# Patient Record
Sex: FEMALE | Race: WHITE | NOT HISPANIC OR LATINO | Employment: OTHER | URBAN - METROPOLITAN AREA
[De-identification: names, ages, dates, MRNs, and addresses within clinical notes are randomized per-mention and may not be internally consistent; named-entity substitution may affect disease eponyms.]

---

## 2023-08-31 ENCOUNTER — HOSPITAL ENCOUNTER (OUTPATIENT)
Dept: DATA CONVERSION | Facility: HOSPITAL | Age: 73
Discharge: HOME | End: 2023-08-31
Payer: MEDICARE

## 2023-08-31 DIAGNOSIS — E11.9 TYPE 2 DIABETES MELLITUS WITHOUT COMPLICATIONS (MULTI): ICD-10-CM

## 2023-08-31 LAB
ALBUMIN SERPL-MCNC: 4.3 GM/DL (ref 3.5–5)
ALBUMIN/GLOB SERPL: 1.7 RATIO (ref 1.5–3)
ALP BLD-CCNC: 87 U/L (ref 35–125)
ALT SERPL-CCNC: 12 U/L (ref 5–40)
ANION GAP SERPL CALCULATED.3IONS-SCNC: 15 MMOL/L (ref 0–19)
AST SERPL-CCNC: 18 U/L (ref 5–40)
BILIRUB SERPL-MCNC: 0.4 MG/DL (ref 0.1–1.2)
BUN SERPL-MCNC: 9 MG/DL (ref 8–25)
BUN/CREAT SERPL: 9 RATIO (ref 8–21)
CALCIUM SERPL-MCNC: 9.8 MG/DL (ref 8.5–10.4)
CHLORIDE SERPL-SCNC: 104 MMOL/L (ref 97–107)
CO2 SERPL-SCNC: 24 MMOL/L (ref 24–31)
CREAT SERPL-MCNC: 1 MG/DL (ref 0.4–1.6)
CREAT UR-MCNC: 373.1 MG/DL
GFR SERPL CREATININE-BSD FRML MDRD: 60 ML/MIN/1.73 M2
GLOBULIN SER-MCNC: 2.6 G/DL (ref 1.9–3.7)
GLUCOSE SERPL-MCNC: 114 MG/DL (ref 65–99)
HBA1C MFR BLD: 6.9 % (ref 4–6)
MICROALBUMIN UR-MCNC: 87 MG/L (ref 0–23)
MICROALBUMIN/CREAT UR: 23.3 MG/G (ref 0–30)
POTASSIUM SERPL-SCNC: 4.1 MMOL/L (ref 3.4–5.1)
PROT SERPL-MCNC: 6.9 G/DL (ref 5.9–7.9)
SODIUM SERPL-SCNC: 143 MMOL/L (ref 133–145)
THYROTROPIN (MIU/L) IN SER/PLAS BY DETECTION LIMIT <= 0.05 MIU/L: 1.98 MIU/L (ref 0.44–3.98)
VIT B12 SERPL-MCNC: 383 PG/ML (ref 211–946)

## 2023-09-07 PROBLEM — M85.80 OSTEOPENIA: Status: ACTIVE | Noted: 2023-09-07

## 2023-09-07 PROBLEM — C43.62 MALIGNANT MELANOMA OF LEFT UPPER EXTREMITY INCLUDING SHOULDER (MULTI): Status: ACTIVE | Noted: 2023-09-07

## 2023-09-07 PROBLEM — E78.5 HYPERLIPIDEMIA: Status: ACTIVE | Noted: 2023-09-07

## 2023-09-07 PROBLEM — I48.19 OTHER PERSISTENT ATRIAL FIBRILLATION (MULTI): Status: ACTIVE | Noted: 2023-09-07

## 2023-09-07 PROBLEM — I48.0 PAROXYSMAL ATRIAL FIBRILLATION (MULTI): Status: ACTIVE | Noted: 2023-09-07

## 2023-09-07 PROBLEM — C09.9: Status: ACTIVE | Noted: 2023-09-07

## 2023-09-07 PROBLEM — I25.5 ISCHEMIC CARDIOMYOPATHY: Status: ACTIVE | Noted: 2023-09-07

## 2023-09-07 PROBLEM — E55.9 VITAMIN D DEFICIENCY: Status: ACTIVE | Noted: 2023-09-07

## 2023-09-07 PROBLEM — M51.26 LUMBAGO DUE TO DISPLACEMENT OF INTERVERTEBRAL DISC: Status: ACTIVE | Noted: 2023-09-07

## 2023-09-07 PROBLEM — K21.9 CHRONIC GERD: Status: ACTIVE | Noted: 2023-09-07

## 2023-09-07 PROBLEM — I10 HTN (HYPERTENSION): Status: ACTIVE | Noted: 2023-09-07

## 2023-09-07 PROBLEM — C44.42 SCC (SQUAMOUS CELL CARCINOMA), SCALP/NECK: Status: ACTIVE | Noted: 2023-09-07

## 2023-09-07 PROBLEM — R59.1 LYMPHADENOPATHY: Status: ACTIVE | Noted: 2023-09-07

## 2023-09-07 PROBLEM — Z95.5 HISTORY OF CORONARY ARTERY STENT PLACEMENT: Status: ACTIVE | Noted: 2023-09-07

## 2023-09-07 PROBLEM — G43.909 MIGRAINES: Status: ACTIVE | Noted: 2023-09-07

## 2023-09-07 PROBLEM — I25.10 CORONARY ARTERY DISEASE INVOLVING NATIVE CORONARY ARTERY OF NATIVE HEART WITHOUT ANGINA PECTORIS: Status: ACTIVE | Noted: 2023-09-07

## 2023-09-07 PROBLEM — E11.9 TYPE 2 DIABETES MELLITUS WITHOUT COMPLICATION, WITHOUT LONG-TERM CURRENT USE OF INSULIN (MULTI): Status: ACTIVE | Noted: 2023-09-07

## 2023-09-07 PROBLEM — M51.16 LUMBAR DISC DISEASE WITH RADICULOPATHY: Status: ACTIVE | Noted: 2023-09-07

## 2023-09-07 PROBLEM — C09.9 SQUAMOUS CELL CARCINOMA OF TONSIL (MULTI): Status: ACTIVE | Noted: 2023-09-07

## 2023-09-07 PROBLEM — N20.0 RENAL STONES: Status: ACTIVE | Noted: 2023-09-07

## 2023-09-07 PROBLEM — Z85.820 H/O MALIGNANT MELANOMA: Status: ACTIVE | Noted: 2023-09-07

## 2023-09-07 PROBLEM — G89.29 OTHER CHRONIC PAIN: Status: ACTIVE | Noted: 2023-09-07

## 2023-09-07 PROBLEM — R22.1 NECK MASS: Status: ACTIVE | Noted: 2023-09-07

## 2023-09-07 PROBLEM — Z98.890: Status: ACTIVE | Noted: 2023-09-07

## 2023-11-17 ENCOUNTER — APPOINTMENT (OUTPATIENT)
Dept: OTOLARYNGOLOGY | Facility: HOSPITAL | Age: 73
End: 2023-11-17
Payer: COMMERCIAL

## 2023-12-08 ENCOUNTER — APPOINTMENT (OUTPATIENT)
Dept: OTOLARYNGOLOGY | Facility: HOSPITAL | Age: 73
End: 2023-12-08
Payer: COMMERCIAL

## 2023-12-09 DIAGNOSIS — G43.809 OTHER MIGRAINE WITHOUT STATUS MIGRAINOSUS, NOT INTRACTABLE: Primary | ICD-10-CM

## 2023-12-11 DIAGNOSIS — K21.9 GERD WITHOUT ESOPHAGITIS: Primary | ICD-10-CM

## 2023-12-11 RX ORDER — TOPIRAMATE 100 MG/1
TABLET, FILM COATED ORAL
Qty: 270 TABLET | Refills: 3 | Status: SHIPPED | OUTPATIENT
Start: 2023-12-11 | End: 2024-02-01 | Stop reason: ALTCHOICE

## 2023-12-11 RX ORDER — PANTOPRAZOLE SODIUM 40 MG/1
40 TABLET, DELAYED RELEASE ORAL DAILY
Qty: 90 TABLET | Refills: 3 | Status: SHIPPED | OUTPATIENT
Start: 2023-12-11 | End: 2024-02-01 | Stop reason: WASHOUT

## 2023-12-11 RX ORDER — PANTOPRAZOLE SODIUM 40 MG/1
1 TABLET, DELAYED RELEASE ORAL DAILY
COMMUNITY
Start: 2021-03-31 | End: 2023-12-11 | Stop reason: SDUPTHER

## 2023-12-18 ENCOUNTER — TELEPHONE (OUTPATIENT)
Dept: PRIMARY CARE | Facility: CLINIC | Age: 73
End: 2023-12-18
Payer: MEDICARE

## 2023-12-18 DIAGNOSIS — E78.2 MIXED HYPERLIPIDEMIA: Primary | ICD-10-CM

## 2023-12-18 RX ORDER — ROSUVASTATIN CALCIUM 10 MG/1
10 TABLET, COATED ORAL DAILY
Qty: 30 TABLET | Refills: 5 | Status: SHIPPED | OUTPATIENT
Start: 2023-12-18 | End: 2023-12-29 | Stop reason: SDUPTHER

## 2023-12-18 NOTE — TELEPHONE ENCOUNTER
Dr. Carter coverage.  Patient is requesting a refill of rosuvastatin but an allergy warning is coming up.  Unsure of how to proceed.  Last seen 08/01/23.

## 2023-12-28 ENCOUNTER — TELEPHONE (OUTPATIENT)
Dept: PRIMARY CARE | Facility: CLINIC | Age: 73
End: 2023-12-28
Payer: MEDICARE

## 2023-12-28 DIAGNOSIS — E78.2 MIXED HYPERLIPIDEMIA: ICD-10-CM

## 2023-12-28 NOTE — TELEPHONE ENCOUNTER
Patient called and stated she only received a 30 day supply of her rosuvastatin.  She is asking for the additional 60 days to be called into [facility] pharmacy.

## 2023-12-29 RX ORDER — ROSUVASTATIN CALCIUM 10 MG/1
10 TABLET, COATED ORAL DAILY
Qty: 90 TABLET | Refills: 3 | Status: SHIPPED | OUTPATIENT
Start: 2023-12-29 | End: 2024-02-01 | Stop reason: WASHOUT

## 2024-01-05 ENCOUNTER — OFFICE VISIT (OUTPATIENT)
Dept: PRIMARY CARE | Facility: CLINIC | Age: 74
End: 2024-01-05
Payer: MEDICARE

## 2024-01-05 ENCOUNTER — TELEPHONE (OUTPATIENT)
Dept: OTOLARYNGOLOGY | Facility: HOSPITAL | Age: 74
End: 2024-01-05
Payer: MEDICARE

## 2024-01-05 VITALS
OXYGEN SATURATION: 97 % | DIASTOLIC BLOOD PRESSURE: 70 MMHG | HEART RATE: 78 BPM | SYSTOLIC BLOOD PRESSURE: 138 MMHG | WEIGHT: 147 LBS | HEIGHT: 64 IN | BODY MASS INDEX: 25.1 KG/M2

## 2024-01-05 DIAGNOSIS — H34.8120 CENTRAL RETINAL VEIN OCCLUSION WITH MACULAR EDEMA OF LEFT EYE (CMS-HCC): Primary | ICD-10-CM

## 2024-01-05 DIAGNOSIS — R21 RASH: ICD-10-CM

## 2024-01-05 PROCEDURE — 1126F AMNT PAIN NOTED NONE PRSNT: CPT | Performed by: FAMILY MEDICINE

## 2024-01-05 PROCEDURE — 1159F MED LIST DOCD IN RCRD: CPT | Performed by: FAMILY MEDICINE

## 2024-01-05 PROCEDURE — 99213 OFFICE O/P EST LOW 20 MIN: CPT | Performed by: FAMILY MEDICINE

## 2024-01-05 PROCEDURE — 1036F TOBACCO NON-USER: CPT | Performed by: FAMILY MEDICINE

## 2024-01-05 RX ORDER — LOSARTAN POTASSIUM 25 MG/1
25 TABLET ORAL DAILY
COMMUNITY
Start: 2023-10-24 | End: 2024-02-01 | Stop reason: SDUPTHER

## 2024-01-05 RX ORDER — METFORMIN HYDROCHLORIDE EXTENDED-RELEASE TABLETS 500 MG/1
500 TABLET, FILM COATED, EXTENDED RELEASE ORAL
COMMUNITY
End: 2024-02-01 | Stop reason: WASHOUT

## 2024-01-05 RX ORDER — ACETAMINOPHEN AND CODEINE PHOSPHATE 300; 60 MG/1; MG/1
1 TABLET ORAL EVERY 6 HOURS PRN
COMMUNITY
Start: 2023-02-02 | End: 2024-02-01 | Stop reason: SDUPTHER

## 2024-01-05 RX ORDER — BLOOD SUGAR DIAGNOSTIC
STRIP MISCELLANEOUS
COMMUNITY
Start: 2023-07-17

## 2024-01-05 RX ORDER — ROSUVASTATIN CALCIUM 10 MG/1
10 TABLET, COATED ORAL DAILY
COMMUNITY
Start: 2023-12-18 | End: 2024-02-01 | Stop reason: ALTCHOICE

## 2024-01-05 RX ORDER — BETAMETHASONE VALERATE 1.2 MG/G
OINTMENT TOPICAL AS NEEDED
COMMUNITY
Start: 2023-08-28

## 2024-01-05 RX ORDER — PANTOPRAZOLE SODIUM 40 MG/1
40 TABLET, DELAYED RELEASE ORAL
COMMUNITY
Start: 2023-12-15 | End: 2024-02-01 | Stop reason: ALTCHOICE

## 2024-01-05 RX ORDER — ALLOPURINOL 300 MG/1
300 TABLET ORAL DAILY
COMMUNITY
Start: 2023-10-24

## 2024-01-05 RX ORDER — TOPIRAMATE 100 MG/1
100 TABLET, FILM COATED ORAL DAILY
COMMUNITY
Start: 2023-12-17 | End: 2024-02-01 | Stop reason: WASHOUT

## 2024-01-05 RX ORDER — LIDOCAINE 50 MG/G
1 PATCH TOPICAL AS NEEDED
COMMUNITY
Start: 2023-04-24

## 2024-01-05 ASSESSMENT — PATIENT HEALTH QUESTIONNAIRE - PHQ9
SUM OF ALL RESPONSES TO PHQ9 QUESTIONS 1 AND 2: 0
2. FEELING DOWN, DEPRESSED OR HOPELESS: NOT AT ALL
1. LITTLE INTEREST OR PLEASURE IN DOING THINGS: NOT AT ALL

## 2024-01-05 ASSESSMENT — ENCOUNTER SYMPTOMS
EYE ITCHING: 1
NUMBNESS: 0
EYE REDNESS: 0
EYE PAIN: 0

## 2024-01-05 ASSESSMENT — PAIN SCALES - GENERAL: PAINLEVEL: 0-NO PAIN

## 2024-01-05 NOTE — TELEPHONE ENCOUNTER
Patient did not show for her appt at 12:45pm today. Called patient and left message to make sure she is ok and to reschedule another appt. Waiting for return call.

## 2024-01-05 NOTE — PROGRESS NOTES
[facility]: [facility]  E/M EVALUATION    Damari Hicks is a 73 y.o. female who presents for Follow-up (Pt lost vision in left eye, concerned with possible shingles per her ophthalmologist (has pending appt with specialist)/dmw/Pt also concerned with blister below her lip/dmw).    Subjective   Week ago got cap and crack  her left eye.  Flushed it out with water everyday.  Having blurry vision left eye.  She went to the eye doctor.  Was told there blood pressure was 180 there.  Found to have central retinal vein occlusion.  Has appt with retinal specialist on the 12th.      She did have blister form right lower lip  using cortisone cream then got one on the left  lower lip.  And latera one around the right eye.         Review of Systems   Eyes:  Positive for itching and visual disturbance. Negative for pain and redness.   Skin:  Positive for rash.   Neurological:  Negative for numbness.       Objective   Vitals:    01/05/24 1113   BP: 138/70   Pulse: 78   SpO2: 97%     Physical Exam  Constitutional:       Appearance: Normal appearance.   Eyes:      Conjunctiva/sclera: Conjunctivae normal.   Cardiovascular:      Rate and Rhythm: Normal rate and regular rhythm.      Heart sounds: No murmur heard.  Pulmonary:      Effort: Pulmonary effort is normal.      Breath sounds: Normal breath sounds.   Musculoskeletal:      Right lower leg: No edema.      Left lower leg: No edema.   Skin:     Comments: 1 cm right chin healing skin lesion.  Left chin nml. Right frontal scalp, small reddish patch.     Neurological:      Mental Status: She is alert.      Comments: Nml sensation all facial dermatomes bilaterally.             Assessment/Plan      There is no problem list on file for this patient.      Diagnoses and all orders for this visit:  Central retinal vein occlusion with macular edema of left eye  Rash  Retinal specialist as scheduled. No shingles.     The patient was encouraged to ensure that  any/all documentation is accurate and up to date, and that our office be provided a copy in the event that anything changes.         Felix Carter MD

## 2024-02-01 ENCOUNTER — OFFICE VISIT (OUTPATIENT)
Dept: PRIMARY CARE | Facility: CLINIC | Age: 74
End: 2024-02-01
Payer: MEDICARE

## 2024-02-01 VITALS
HEART RATE: 77 BPM | DIASTOLIC BLOOD PRESSURE: 86 MMHG | HEIGHT: 64 IN | WEIGHT: 145 LBS | BODY MASS INDEX: 24.75 KG/M2 | SYSTOLIC BLOOD PRESSURE: 120 MMHG | OXYGEN SATURATION: 97 %

## 2024-02-01 DIAGNOSIS — E78.2 MIXED HYPERLIPIDEMIA: ICD-10-CM

## 2024-02-01 DIAGNOSIS — I10 PRIMARY HYPERTENSION: ICD-10-CM

## 2024-02-01 DIAGNOSIS — K21.9 CHRONIC GERD: ICD-10-CM

## 2024-02-01 DIAGNOSIS — G43.809 OTHER MIGRAINE WITHOUT STATUS MIGRAINOSUS, NOT INTRACTABLE: ICD-10-CM

## 2024-02-01 DIAGNOSIS — E11.9 ALTERED METABOLISM DUE TO DIABETES (MULTI): Primary | ICD-10-CM

## 2024-02-01 DIAGNOSIS — M54.30 SCIATICA, UNSPECIFIED LATERALITY: ICD-10-CM

## 2024-02-01 PROCEDURE — 99214 OFFICE O/P EST MOD 30 MIN: CPT | Performed by: FAMILY MEDICINE

## 2024-02-01 PROCEDURE — 3079F DIAST BP 80-89 MM HG: CPT | Performed by: FAMILY MEDICINE

## 2024-02-01 PROCEDURE — 1036F TOBACCO NON-USER: CPT | Performed by: FAMILY MEDICINE

## 2024-02-01 PROCEDURE — 1159F MED LIST DOCD IN RCRD: CPT | Performed by: FAMILY MEDICINE

## 2024-02-01 PROCEDURE — 3074F SYST BP LT 130 MM HG: CPT | Performed by: FAMILY MEDICINE

## 2024-02-01 PROCEDURE — 4010F ACE/ARB THERAPY RXD/TAKEN: CPT | Performed by: FAMILY MEDICINE

## 2024-02-01 PROCEDURE — 1157F ADVNC CARE PLAN IN RCRD: CPT | Performed by: FAMILY MEDICINE

## 2024-02-01 PROCEDURE — 1125F AMNT PAIN NOTED PAIN PRSNT: CPT | Performed by: FAMILY MEDICINE

## 2024-02-01 RX ORDER — ROSUVASTATIN CALCIUM 10 MG/1
10 TABLET, COATED ORAL DAILY
Qty: 90 TABLET | Refills: 3 | Status: SHIPPED | OUTPATIENT
Start: 2024-02-01 | End: 2025-01-31

## 2024-02-01 RX ORDER — LOSARTAN POTASSIUM 25 MG/1
25 TABLET ORAL DAILY
Qty: 90 TABLET | Refills: 3 | Status: SHIPPED | OUTPATIENT
Start: 2024-02-01 | End: 2025-01-31

## 2024-02-01 RX ORDER — TOPIRAMATE 100 MG/1
TABLET, FILM COATED ORAL
Qty: 270 TABLET | Refills: 3 | Status: SHIPPED | OUTPATIENT
Start: 2024-02-01

## 2024-02-01 RX ORDER — METFORMIN HYDROCHLORIDE 500 MG/1
500 TABLET ORAL
Qty: 90 TABLET | Refills: 3 | Status: SHIPPED | OUTPATIENT
Start: 2024-02-01 | End: 2024-03-20 | Stop reason: SDUPTHER

## 2024-02-01 RX ORDER — ACETAMINOPHEN AND CODEINE PHOSPHATE 300; 60 MG/1; MG/1
1 TABLET ORAL EVERY 6 HOURS PRN
Qty: 28 TABLET | Refills: 0 | Status: SHIPPED | OUTPATIENT
Start: 2024-02-01 | End: 2024-02-01 | Stop reason: SDUPTHER

## 2024-02-01 RX ORDER — PANTOPRAZOLE SODIUM 40 MG/1
40 TABLET, DELAYED RELEASE ORAL
Qty: 90 TABLET | Refills: 3 | Status: SHIPPED | OUTPATIENT
Start: 2024-02-01 | End: 2025-01-31

## 2024-02-01 RX ORDER — ACETAMINOPHEN AND CODEINE PHOSPHATE 300; 60 MG/1; MG/1
1 TABLET ORAL EVERY 6 HOURS PRN
Qty: 28 TABLET | Refills: 0 | Status: SHIPPED | OUTPATIENT
Start: 2024-02-01 | End: 2024-02-08

## 2024-02-01 ASSESSMENT — PAIN SCALES - GENERAL: PAINLEVEL: 3

## 2024-02-01 ASSESSMENT — PATIENT HEALTH QUESTIONNAIRE - PHQ9
2. FEELING DOWN, DEPRESSED OR HOPELESS: NOT AT ALL
1. LITTLE INTEREST OR PLEASURE IN DOING THINGS: NOT AT ALL
SUM OF ALL RESPONSES TO PHQ9 QUESTIONS 1 AND 2: 0

## 2024-02-01 ASSESSMENT — ENCOUNTER SYMPTOMS
ARTHRALGIAS: 1
BACK PAIN: 1

## 2024-02-01 NOTE — PROGRESS NOTES
[facility]: [facility]  E/M EVALUATION    Damari Hicks is a 73 y.o. female who presents for Follow-up (Patient is also wanting to update you on her left eye/dd ) and Med Refill (Patient is needing medication refills/dd ).    Subjective   Retinal vein occlusion- 90% vision back.  Has severe pain after injection, had to take apap/CDN #4.      HTN- home readings usually 120/60s.      Chronic sciatica- most days can just use lidocaine patch,  had to use pills for eye pain.  Requests refills oarrs reviewed.      Chronic gerd- controlled on PPI    DM- controlled, refills.     Med Refill  Associated symptoms include arthralgias.     Review of Systems   Eyes:  Positive for visual disturbance.   Musculoskeletal:  Positive for arthralgias and back pain.       Objective   Vitals:    02/01/24 1324   BP: 120/86   Pulse: 77   SpO2: 97%     Physical Exam  Constitutional:       Appearance: Normal appearance.   Cardiovascular:      Rate and Rhythm: Normal rate and regular rhythm.      Heart sounds: No murmur heard.  Pulmonary:      Effort: Pulmonary effort is normal.      Breath sounds: Normal breath sounds.   Musculoskeletal:      Right lower leg: No edema.      Left lower leg: No edema.   Neurological:      Mental Status: She is alert.           Assessment/Plan      Patient Active Problem List   Diagnosis    Vitamin D deficiency    Type 2 diabetes mellitus without complication, without long-term current use of insulin (CMS/HCC)    SCC (squamous cell carcinoma), scalp/neck    Renal stones    Squamous cell carcinoma of tonsil (CMS/HCC)    Primary cancer of tonsil (CMS/HCC)    Paroxysmal atrial fibrillation (CMS/HCC)    Other persistent atrial fibrillation (CMS/HCC)    Other chronic pain    Osteopenia    Neck mass    Migraines    Malignant melanoma of left upper extremity including shoulder (CMS/HCC)    Lymphadenopathy    Lumbar disc disease with radiculopathy    Lumbago due to displacement of  intervertebral disc    Ischemic cardiomyopathy    Hyperlipidemia    HTN (hypertension)    History of coronary artery stent placement    H/O Malignant melanoma    Coronary artery disease involving native coronary artery of native heart without angina pectoris    Chronic GERD    History of radical dissection of left side of neck       Diagnoses and all orders for this visit:  Altered metabolism due to diabetes (CMS/HCC)  -     metFORMIN (Glucophage) 500 mg tablet; Take 1 tablet (500 mg) by mouth once daily with breakfast.  -     Comprehensive Metabolic Panel; Future  -     Hemoglobin A1C; Future  -     Lipid Panel; Future  Other migraine without status migrainosus, not intractable  -     topiramate (Topamax) 100 mg tablet; Take 1 tablet (100 mg) by mouth once daily AND 2 tablets (200 mg) once daily at bedtime.  Primary hypertension  -     losartan (Cozaar) 25 mg tablet; Take 1 tablet (25 mg) by mouth once daily.  Mixed hyperlipidemia  -     rosuvastatin (Crestor) 10 mg tablet; Take 1 tablet (10 mg) by mouth once daily.  Chronic GERD  -     pantoprazole (ProtoNix) 40 mg EC tablet; Take 1 tablet (40 mg) by mouth once daily in the morning. Take before meals.  Sciatica, unspecified laterality  -     acetaminophen-codeine (Tylenol w/ Codeine #4) 300-60 mg tablet; 1 tablet by g-tube route every 6 hours if needed for severe pain (7 - 10) for up to 7 days.  Oarrs.      The patient was encouraged to ensure that any/all documentation is accurate and up to date, and that our office be provided a copy in the event that anything changes.         Felix Carter MD

## 2024-02-02 ENCOUNTER — TELEPHONE (OUTPATIENT)
Dept: OTOLARYNGOLOGY | Facility: HOSPITAL | Age: 74
End: 2024-02-02
Payer: MEDICARE

## 2024-02-02 NOTE — TELEPHONE ENCOUNTER
Called patient again and left her a message. Pt missed her follow up with Dr Cordero on 1/5/24. Called patient asking to return call to re-schedule follow up appointment.

## 2024-02-29 ENCOUNTER — OFFICE VISIT (OUTPATIENT)
Dept: PRIMARY CARE | Facility: CLINIC | Age: 74
End: 2024-02-29
Payer: MEDICARE

## 2024-02-29 VITALS
OXYGEN SATURATION: 97 % | SYSTOLIC BLOOD PRESSURE: 128 MMHG | HEART RATE: 77 BPM | DIASTOLIC BLOOD PRESSURE: 80 MMHG | BODY MASS INDEX: 25.1 KG/M2 | WEIGHT: 147 LBS | HEIGHT: 64 IN

## 2024-02-29 DIAGNOSIS — H92.01 RIGHT EAR PAIN: Primary | ICD-10-CM

## 2024-02-29 PROCEDURE — 99212 OFFICE O/P EST SF 10 MIN: CPT | Performed by: FAMILY MEDICINE

## 2024-02-29 PROCEDURE — 1036F TOBACCO NON-USER: CPT | Performed by: FAMILY MEDICINE

## 2024-02-29 PROCEDURE — 1159F MED LIST DOCD IN RCRD: CPT | Performed by: FAMILY MEDICINE

## 2024-02-29 PROCEDURE — 4010F ACE/ARB THERAPY RXD/TAKEN: CPT | Performed by: FAMILY MEDICINE

## 2024-02-29 PROCEDURE — 1157F ADVNC CARE PLAN IN RCRD: CPT | Performed by: FAMILY MEDICINE

## 2024-02-29 PROCEDURE — 3079F DIAST BP 80-89 MM HG: CPT | Performed by: FAMILY MEDICINE

## 2024-02-29 PROCEDURE — 3074F SYST BP LT 130 MM HG: CPT | Performed by: FAMILY MEDICINE

## 2024-02-29 PROCEDURE — 1125F AMNT PAIN NOTED PAIN PRSNT: CPT | Performed by: FAMILY MEDICINE

## 2024-02-29 ASSESSMENT — ENCOUNTER SYMPTOMS
SINUS PAIN: 0
SORE THROAT: 0
RHINORRHEA: 0

## 2024-02-29 ASSESSMENT — PATIENT HEALTH QUESTIONNAIRE - PHQ9
2. FEELING DOWN, DEPRESSED OR HOPELESS: NOT AT ALL
SUM OF ALL RESPONSES TO PHQ9 QUESTIONS 1 AND 2: 0
1. LITTLE INTEREST OR PLEASURE IN DOING THINGS: NOT AT ALL

## 2024-02-29 ASSESSMENT — PAIN SCALES - GENERAL: PAINLEVEL: 6

## 2024-02-29 NOTE — PROGRESS NOTES
[facility]: [facility]  E/M EVALUATION    Damari Hicks is a 73 y.o. female who presents for Earache (Patient is here for intermittent earache in right ear/dd).    Subjective   Pt here for acute right ear pain.  States was severe today.  But resolved by the time of the appt.      She states right eye vision improving, will be getting another appt.      Earache   Pertinent negatives include no rhinorrhea or sore throat.     Review of Systems   HENT:  Positive for ear pain. Negative for rhinorrhea, sinus pain and sore throat.        Objective   Vitals:    02/29/24 1541   BP: 128/80   Pulse: 77   SpO2: 97%     Physical Exam  HENT:      Right Ear: Hearing normal.      Left Ear: Hearing and tympanic membrane normal.      Ears:        Comments: Right TM is notably injected on the region noted.  Pt was able to           Assessment/Plan      Patient Active Problem List   Diagnosis    Vitamin D deficiency    Type 2 diabetes mellitus without complication, without long-term current use of insulin (CMS/HCC)    SCC (squamous cell carcinoma), scalp/neck    Renal stones    Squamous cell carcinoma of tonsil (CMS/HCC)    Primary cancer of tonsil (CMS/HCC)    Paroxysmal atrial fibrillation (CMS/HCC)    Other persistent atrial fibrillation (CMS/HCC)    Other chronic pain    Osteopenia    Neck mass    Migraines    Malignant melanoma of left upper extremity including shoulder (CMS/HCC)    Lymphadenopathy    Lumbar disc disease with radiculopathy    Lumbago due to displacement of intervertebral disc    Ischemic cardiomyopathy    Hyperlipidemia    HTN (hypertension)    History of coronary artery stent placement    H/O Malignant melanoma    Coronary artery disease involving native coronary artery of native heart without angina pectoris    Chronic GERD    History of radical dissection of left side of neck       Diagnoses and all orders for this visit:  Right ear pain  Suspect self resolving issue. If pain  re-occurs will use abx.     The patient was encouraged to ensure that any/all documentation is accurate and up to date, and that our office be provided a copy in the event that anything changes.         Felix Carter MD

## 2024-03-11 ENCOUNTER — LAB (OUTPATIENT)
Dept: LAB | Facility: LAB | Age: 74
End: 2024-03-11
Payer: MEDICARE

## 2024-03-11 DIAGNOSIS — E11.9 ALTERED METABOLISM DUE TO DIABETES (MULTI): ICD-10-CM

## 2024-03-11 LAB
ALBUMIN SERPL-MCNC: 4.3 G/DL (ref 3.5–5)
ALP BLD-CCNC: 92 U/L (ref 35–125)
ALT SERPL-CCNC: 9 U/L (ref 5–40)
ANION GAP SERPL CALC-SCNC: 11 MMOL/L
AST SERPL-CCNC: 14 U/L (ref 5–40)
BILIRUB SERPL-MCNC: 0.3 MG/DL (ref 0.1–1.2)
BUN SERPL-MCNC: 10 MG/DL (ref 8–25)
CALCIUM SERPL-MCNC: 9.5 MG/DL (ref 8.5–10.4)
CHLORIDE SERPL-SCNC: 104 MMOL/L (ref 97–107)
CHOLEST SERPL-MCNC: 161 MG/DL (ref 133–200)
CHOLEST/HDLC SERPL: 3.7 {RATIO}
CO2 SERPL-SCNC: 28 MMOL/L (ref 24–31)
CREAT SERPL-MCNC: 0.9 MG/DL (ref 0.4–1.6)
EGFRCR SERPLBLD CKD-EPI 2021: 68 ML/MIN/1.73M*2
EST. AVERAGE GLUCOSE BLD GHB EST-MCNC: 157 MG/DL
GLUCOSE SERPL-MCNC: 118 MG/DL (ref 65–99)
HBA1C MFR BLD: 7.1 %
HDLC SERPL-MCNC: 44 MG/DL
LDLC SERPL CALC-MCNC: 81 MG/DL (ref 65–130)
POTASSIUM SERPL-SCNC: 4.2 MMOL/L (ref 3.4–5.1)
PROT SERPL-MCNC: 6.4 G/DL (ref 5.9–7.9)
SODIUM SERPL-SCNC: 143 MMOL/L (ref 133–145)
TRIGL SERPL-MCNC: 181 MG/DL (ref 40–150)

## 2024-03-11 PROCEDURE — 36415 COLL VENOUS BLD VENIPUNCTURE: CPT

## 2024-03-11 PROCEDURE — 80053 COMPREHEN METABOLIC PANEL: CPT

## 2024-03-11 PROCEDURE — 80061 LIPID PANEL: CPT

## 2024-03-11 PROCEDURE — 83036 HEMOGLOBIN GLYCOSYLATED A1C: CPT

## 2024-03-14 ENCOUNTER — TELEPHONE (OUTPATIENT)
Dept: PRIMARY CARE | Facility: CLINIC | Age: 74
End: 2024-03-14
Payer: MEDICARE

## 2024-03-14 NOTE — TELEPHONE ENCOUNTER
----- Message from Felix Carter MD sent at 3/13/2024  9:35 PM EDT -----  Labs are stable repeat in 6 months.

## 2024-03-19 ENCOUNTER — TELEPHONE (OUTPATIENT)
Dept: PRIMARY CARE | Facility: CLINIC | Age: 74
End: 2024-03-19
Payer: MEDICARE

## 2024-03-19 DIAGNOSIS — E11.9 ALTERED METABOLISM DUE TO DIABETES (MULTI): ICD-10-CM

## 2024-03-19 NOTE — TELEPHONE ENCOUNTER
Patient called to inquire about whether she should increase her metformin to 2 pills daily due to her A1c going back up.  Please advise.  Thank you.

## 2024-03-20 DIAGNOSIS — E11.9 ALTERED METABOLISM DUE TO DIABETES (MULTI): ICD-10-CM

## 2024-03-20 RX ORDER — METFORMIN HYDROCHLORIDE 500 MG/1
500 TABLET ORAL
Qty: 180 TABLET | Refills: 3 | OUTPATIENT
Start: 2024-03-20 | End: 2025-03-20

## 2024-03-20 RX ORDER — METFORMIN HYDROCHLORIDE 500 MG/1
500 TABLET ORAL
Qty: 180 TABLET | Refills: 3 | Status: SHIPPED | OUTPATIENT
Start: 2024-03-20 | End: 2024-03-21 | Stop reason: SDUPTHER

## 2024-03-20 NOTE — TELEPHONE ENCOUNTER
Pt called back asking for metformin to be sent to [facility] pharmacy, this will not cost her anything going here.updated pharmacy.

## 2024-03-21 RX ORDER — METFORMIN HYDROCHLORIDE 500 MG/1
500 TABLET ORAL
Qty: 180 TABLET | Refills: 3 | Status: SHIPPED | OUTPATIENT
Start: 2024-03-21 | End: 2025-03-21

## 2024-03-25 ENCOUNTER — TELEPHONE (OUTPATIENT)
Dept: PRIMARY CARE | Facility: CLINIC | Age: 74
End: 2024-03-25
Payer: MEDICARE

## 2024-03-25 DIAGNOSIS — E11.9 ALTERED METABOLISM DUE TO DIABETES (MULTI): ICD-10-CM

## 2024-03-25 RX ORDER — METFORMIN HYDROCHLORIDE 500 MG/1
500 TABLET ORAL
Qty: 180 TABLET | Refills: 3 | Status: CANCELLED | OUTPATIENT
Start: 2024-03-25 | End: 2025-03-25

## 2024-03-25 NOTE — TELEPHONE ENCOUNTER
Patient said you agreed to increasing her Metformin but was called into the wrong pharmacy, she is requesting the increased dose be sent to [facility].

## 2024-04-01 PROCEDURE — 88175 CYTOPATH C/V AUTO FLUID REDO: CPT

## 2024-04-02 ENCOUNTER — LAB REQUISITION (OUTPATIENT)
Dept: LAB | Facility: HOSPITAL | Age: 74
End: 2024-04-02
Payer: MEDICARE

## 2024-04-02 DIAGNOSIS — Z11.51 ENCOUNTER FOR SCREENING FOR HUMAN PAPILLOMAVIRUS (HPV): ICD-10-CM

## 2024-04-02 DIAGNOSIS — Z01.419 ENCOUNTER FOR GYNECOLOGICAL EXAMINATION (GENERAL) (ROUTINE) WITHOUT ABNORMAL FINDINGS: ICD-10-CM

## 2024-04-10 LAB
CYTOLOGY CMNT CVX/VAG CYTO-IMP: NORMAL
LAB AP HPV GENOTYPE QUESTION: YES
LAB AP HPV HR: NORMAL
LABORATORY COMMENT REPORT: NORMAL
MENSTRUAL HX REPORTED: NORMAL
PATH REPORT.TOTAL CANCER: NORMAL

## 2024-05-14 ENCOUNTER — HOSPITAL ENCOUNTER (OUTPATIENT)
Dept: RADIOLOGY | Facility: CLINIC | Age: 74
Discharge: HOME | End: 2024-05-14
Payer: MEDICARE

## 2024-05-14 VITALS — WEIGHT: 148 LBS | HEIGHT: 64 IN | BODY MASS INDEX: 25.27 KG/M2

## 2024-05-14 DIAGNOSIS — Z12.31 ENCOUNTER FOR SCREENING MAMMOGRAM FOR MALIGNANT NEOPLASM OF BREAST: ICD-10-CM

## 2024-05-14 PROCEDURE — 77067 SCR MAMMO BI INCL CAD: CPT

## 2024-05-14 PROCEDURE — 77067 SCR MAMMO BI INCL CAD: CPT | Performed by: RADIOLOGY

## 2024-05-14 PROCEDURE — 77063 BREAST TOMOSYNTHESIS BI: CPT | Performed by: RADIOLOGY

## 2024-06-04 ENCOUNTER — OFFICE VISIT (OUTPATIENT)
Dept: PRIMARY CARE | Facility: CLINIC | Age: 74
End: 2024-06-04
Payer: MEDICARE

## 2024-06-04 VITALS
HEART RATE: 74 BPM | BODY MASS INDEX: 24.55 KG/M2 | OXYGEN SATURATION: 98 % | SYSTOLIC BLOOD PRESSURE: 124 MMHG | WEIGHT: 143 LBS | DIASTOLIC BLOOD PRESSURE: 76 MMHG

## 2024-06-04 DIAGNOSIS — M54.32 LEFT SIDED SCIATICA: Primary | ICD-10-CM

## 2024-06-04 PROCEDURE — 1158F ADVNC CARE PLAN TLK DOCD: CPT | Performed by: STUDENT IN AN ORGANIZED HEALTH CARE EDUCATION/TRAINING PROGRAM

## 2024-06-04 PROCEDURE — 4010F ACE/ARB THERAPY RXD/TAKEN: CPT | Performed by: STUDENT IN AN ORGANIZED HEALTH CARE EDUCATION/TRAINING PROGRAM

## 2024-06-04 PROCEDURE — 3074F SYST BP LT 130 MM HG: CPT | Performed by: STUDENT IN AN ORGANIZED HEALTH CARE EDUCATION/TRAINING PROGRAM

## 2024-06-04 PROCEDURE — 3051F HG A1C>EQUAL 7.0%<8.0%: CPT | Performed by: STUDENT IN AN ORGANIZED HEALTH CARE EDUCATION/TRAINING PROGRAM

## 2024-06-04 PROCEDURE — 1125F AMNT PAIN NOTED PAIN PRSNT: CPT | Performed by: STUDENT IN AN ORGANIZED HEALTH CARE EDUCATION/TRAINING PROGRAM

## 2024-06-04 PROCEDURE — 3078F DIAST BP <80 MM HG: CPT | Performed by: STUDENT IN AN ORGANIZED HEALTH CARE EDUCATION/TRAINING PROGRAM

## 2024-06-04 PROCEDURE — 1157F ADVNC CARE PLAN IN RCRD: CPT | Performed by: STUDENT IN AN ORGANIZED HEALTH CARE EDUCATION/TRAINING PROGRAM

## 2024-06-04 PROCEDURE — 1123F ACP DISCUSS/DSCN MKR DOCD: CPT | Performed by: STUDENT IN AN ORGANIZED HEALTH CARE EDUCATION/TRAINING PROGRAM

## 2024-06-04 PROCEDURE — 1159F MED LIST DOCD IN RCRD: CPT | Performed by: STUDENT IN AN ORGANIZED HEALTH CARE EDUCATION/TRAINING PROGRAM

## 2024-06-04 PROCEDURE — 99213 OFFICE O/P EST LOW 20 MIN: CPT | Performed by: STUDENT IN AN ORGANIZED HEALTH CARE EDUCATION/TRAINING PROGRAM

## 2024-06-04 PROCEDURE — 1036F TOBACCO NON-USER: CPT | Performed by: STUDENT IN AN ORGANIZED HEALTH CARE EDUCATION/TRAINING PROGRAM

## 2024-06-04 PROCEDURE — 3048F LDL-C <100 MG/DL: CPT | Performed by: STUDENT IN AN ORGANIZED HEALTH CARE EDUCATION/TRAINING PROGRAM

## 2024-06-04 RX ORDER — METHYLPREDNISOLONE 4 MG/1
TABLET ORAL
Qty: 21 TABLET | Refills: 0 | Status: SHIPPED | OUTPATIENT
Start: 2024-06-04 | End: 2024-06-11

## 2024-06-04 RX ORDER — TIZANIDINE 2 MG/1
2 TABLET ORAL EVERY 6 HOURS PRN
Qty: 10 TABLET | Refills: 0 | Status: SHIPPED | OUTPATIENT
Start: 2024-06-04

## 2024-06-04 ASSESSMENT — PAIN SCALES - GENERAL: PAINLEVEL: 6

## 2024-06-04 ASSESSMENT — COLUMBIA-SUICIDE SEVERITY RATING SCALE - C-SSRS
2. HAVE YOU ACTUALLY HAD ANY THOUGHTS OF KILLING YOURSELF?: NO
1. IN THE PAST MONTH, HAVE YOU WISHED YOU WERE DEAD OR WISHED YOU COULD GO TO SLEEP AND NOT WAKE UP?: NO
6. HAVE YOU EVER DONE ANYTHING, STARTED TO DO ANYTHING, OR PREPARED TO DO ANYTHING TO END YOUR LIFE?: NO

## 2024-06-04 NOTE — PROGRESS NOTES
Subjective   Patient ID: Damari Hicks is a 74 y.o. female who presents for Injections (Pt is here for an injection for her lower back, left side pain, for two weeks / nr).    HPI  73 yo female here for L low back pain, down her left leg  L sided sciatica  Pain for 2 weeks  Tried lidocaine patches, TENS unit  Usually can control pain  Had bad accident in her 20s  No recent injury  Normal sensation    Review of Systems  All pertinent positive symptoms are included in the history of present illness.    All other systems have been reviewed and are negative and noncontributory to this patient's current ailments.     Allergies   Allergen Reactions    Amoxicillin Other and Headache     low blood sugar    Atorvastatin Unknown    Bee Venom Protein (Honey Bee) Unknown    Cephalexin Unknown    Clopidogrel Diarrhea    Gabapentin Unknown    Hydrocodone-Acetaminophen Unknown    Iodinated Contrast Media Unknown    Ketorolac Unknown    Meperidine Unknown    Other Unknown     Neurotin    Pilocarpine Other     tremors    Sulfamethoxazole-Trimethoprim Unknown        Immunization History   Administered Date(s) Administered    Flu vaccine (IIV4), preservative free *Check age/dose* 09/14/2015, 08/23/2020    Influenza, High Dose Seasonal, Preservative Free 10/01/2016, 09/19/2017, 09/12/2018    Influenza, Seasonal, Quadrivalent, Adjuvanted 09/15/2021, 09/12/2022    Influenza, injectable, quadrivalent 09/01/2017    Influenza, seasonal, injectable 09/23/2016    Influenza, trivalent, adjuvanted 09/27/2018, 09/03/2019    Novel influenza-H1N1-09, preservative-free 11/22/2009    Pfizer COVID-19 vaccine, Fall 2023, 12 years and older, (30mcg/0.3mL) 09/19/2023    Pfizer COVID-19 vaccine, bivalent, age 12 years and older (30 mcg/0.3 mL) 09/13/2022    Pfizer Gray Cap SARS-CoV-2 04/07/2022    Pfizer Purple Cap SARS-CoV-2 03/15/2021, 04/05/2021, 08/16/2021    Zoster, live 12/29/2014       Objective   Vitals:    06/04/24 1117   BP: 124/76    Pulse: 74   SpO2: 98%   Weight: 64.9 kg (143 lb)       Physical Exam  CONSTITUTIONAL - well nourished, well developed, looks like stated age, in no acute distress  SKIN - normal skin color and pigmentation. No rash, lesions, or nodules visualized  HEAD - no trauma, normocephalic  EYES - extraocular muscles are intact  ENT - atraumatic  NECK - no neck mass was observed  LUNGS - unlabored breathing  ABDOMEN - nondistended  EXTREMITIES - no edema, no deformities  MSK - L LE: 3+ muscle strength in hip flexion, knee flexion and extension, 1+ patellar reflex  NEUROLOGICAL - alert, oriented and no focal signs  PSYCHIATRIC - alert, pleasant and cordial, age-appropriate  IMMUNOLOGIC - no cervical lymphadenopathy     Assessment/Plan   73 yo female here for L sided sciatica  L sided sciatica  Declines Xray  Start medrol dose pack, tizanidine    RTC as needed

## 2024-06-12 ENCOUNTER — APPOINTMENT (OUTPATIENT)
Dept: RADIOLOGY | Facility: HOSPITAL | Age: 74
End: 2024-06-12
Payer: MEDICARE

## 2024-07-25 DIAGNOSIS — E78.2 MIXED HYPERLIPIDEMIA: ICD-10-CM

## 2024-07-25 RX ORDER — ROSUVASTATIN CALCIUM 10 MG/1
10 TABLET, COATED ORAL DAILY
Qty: 30 TABLET | Refills: 11 | Status: SHIPPED | OUTPATIENT
Start: 2024-07-25

## 2024-07-29 ENCOUNTER — OFFICE VISIT (OUTPATIENT)
Dept: CARDIOLOGY | Facility: CLINIC | Age: 74
End: 2024-07-29
Payer: MEDICARE

## 2024-07-29 VITALS — SYSTOLIC BLOOD PRESSURE: 120 MMHG | BODY MASS INDEX: 23.52 KG/M2 | DIASTOLIC BLOOD PRESSURE: 70 MMHG | WEIGHT: 137 LBS

## 2024-07-29 DIAGNOSIS — E78.2 MIXED HYPERLIPIDEMIA: ICD-10-CM

## 2024-07-29 DIAGNOSIS — I25.10 CORONARY ARTERY DISEASE INVOLVING NATIVE CORONARY ARTERY OF NATIVE HEART WITHOUT ANGINA PECTORIS: ICD-10-CM

## 2024-07-29 DIAGNOSIS — I10 PRIMARY HYPERTENSION: ICD-10-CM

## 2024-07-29 DIAGNOSIS — I48.0 PAROXYSMAL ATRIAL FIBRILLATION (MULTI): Primary | ICD-10-CM

## 2024-07-29 DIAGNOSIS — R09.89 BRUIT OF RIGHT CAROTID ARTERY: ICD-10-CM

## 2024-07-29 PROCEDURE — 1036F TOBACCO NON-USER: CPT | Performed by: INTERNAL MEDICINE

## 2024-07-29 PROCEDURE — 99214 OFFICE O/P EST MOD 30 MIN: CPT | Performed by: INTERNAL MEDICINE

## 2024-07-29 PROCEDURE — 1159F MED LIST DOCD IN RCRD: CPT | Performed by: INTERNAL MEDICINE

## 2024-07-29 PROCEDURE — 1125F AMNT PAIN NOTED PAIN PRSNT: CPT | Performed by: INTERNAL MEDICINE

## 2024-07-29 PROCEDURE — 3051F HG A1C>EQUAL 7.0%<8.0%: CPT | Performed by: INTERNAL MEDICINE

## 2024-07-29 PROCEDURE — 1123F ACP DISCUSS/DSCN MKR DOCD: CPT | Performed by: INTERNAL MEDICINE

## 2024-07-29 PROCEDURE — 1157F ADVNC CARE PLAN IN RCRD: CPT | Performed by: INTERNAL MEDICINE

## 2024-07-29 PROCEDURE — 4010F ACE/ARB THERAPY RXD/TAKEN: CPT | Performed by: INTERNAL MEDICINE

## 2024-07-29 PROCEDURE — 3074F SYST BP LT 130 MM HG: CPT | Performed by: INTERNAL MEDICINE

## 2024-07-29 PROCEDURE — 3048F LDL-C <100 MG/DL: CPT | Performed by: INTERNAL MEDICINE

## 2024-07-29 PROCEDURE — 3078F DIAST BP <80 MM HG: CPT | Performed by: INTERNAL MEDICINE

## 2024-07-29 RX ORDER — ROSUVASTATIN CALCIUM 20 MG/1
20 TABLET, COATED ORAL DAILY
Qty: 90 TABLET | Refills: 3 | Status: SHIPPED | OUTPATIENT
Start: 2024-07-29 | End: 2025-07-29

## 2024-07-29 ASSESSMENT — ENCOUNTER SYMPTOMS
PARESTHESIAS: 0
PALPITATIONS: 0
DYSURIA: 0
SHORTNESS OF BREATH: 0
BLURRED VISION: 0
NUMBNESS: 0
HEMATURIA: 0
DYSPNEA ON EXERTION: 0
COUGH: 0
BACK PAIN: 1
ABDOMINAL PAIN: 0

## 2024-07-29 ASSESSMENT — PAIN SCALES - GENERAL: PAINLEVEL: 9

## 2024-07-29 ASSESSMENT — PATIENT HEALTH QUESTIONNAIRE - PHQ9
1. LITTLE INTEREST OR PLEASURE IN DOING THINGS: NOT AT ALL
2. FEELING DOWN, DEPRESSED OR HOPELESS: NOT AT ALL
SUM OF ALL RESPONSES TO PHQ9 QUESTIONS 1 AND 2: 0

## 2024-07-29 NOTE — ASSESSMENT & PLAN NOTE
Check ultrasound of carotid arteries bilaterally.  I will have the patient call me the day afterwards and will review results with her and decide whether further follow-up will be needed.

## 2024-07-29 NOTE — ASSESSMENT & PLAN NOTE
1 episode of atrial fibrillation following 26 bee stings. Dr. Montano Schrode that the atrial fibrillation was likely just a response to the bee stings and did not recommend anticoagulation therapy.  She has had no palpitations or recurrences.  I instructed her to call or return sooner if she has anything to suggest a reoccurrence of the atrial fibrillation.

## 2024-07-29 NOTE — PROGRESS NOTES
Subjective   Damari Hicks is a 74 y.o. female.    Chief Complaint:  Follow-up    HPI  Patient reports for routine follow-up visit.  No chest pain or anginal type symptoms.  No palpitations.  Other than chronic back pain as well as problems after radiation therapy to the left neck area she is doing well.  No cardiac issues.    Review of Systems   Constitutional: Negative for malaise/fatigue.   HENT:  Negative for congestion.    Eyes:  Negative for blurred vision.   Cardiovascular:  Negative for chest pain, dyspnea on exertion and palpitations.   Respiratory:  Negative for cough and shortness of breath.    Musculoskeletal:  Positive for back pain. Negative for joint pain.   Gastrointestinal:  Negative for abdominal pain.   Genitourinary:  Negative for dysuria and hematuria.   Neurological:  Negative for numbness and paresthesias.       Objective   Constitutional:       Appearance: Not in distress.   Eyes:      Conjunctiva/sclera: Conjunctivae normal.   Neck:      Vascular: JVD normal.   Pulmonary:      Breath sounds: Normal breath sounds. No wheezing. No rhonchi. No rales.   Cardiovascular:      Normal rate. Regular rhythm.      Murmurs: There is no murmur.      No gallop.  No click. No rub.   Abdominal:      Palpations: Abdomen is soft.   Neurological:      General: No focal deficit present.      Mental Status: Alert.         Lab Review:   No visits with results within 2 Month(s) from this visit.   Latest known visit with results is:   Lab Requisition on 04/01/2024   Component Date Value    Case Report 04/01/2024                      Value:Gynecologic Cytology                              Case: [id number]                                   Authorizing Provider:  Huma Rubalcava MD    Collected:           04/01/2024 1421              Ordering Location:     [facility]       Received:            04/02/2024 1630                                     [place]                                                                        First Screen:          GAVIN Roland                                                                Specimen:    ThinPrep Liquid-Based Pap-Imaging System Screen, VAGINA, SCREENING, Vaginal Cuff           Final Cytological Interp* 04/01/2024                      Value:                                                    A. THINPREP PAP VAGINA, SCREENING - Vaginal Cuff                                                    Specimen Adequacy                          Satisfactory for evaluation                                                    General Categorization                          Negative for intraepithelial lesion or malignancy.                                                    Descriptive Interpretation                          Negative for intraepithelial lesion or malignancy                          Cellular changes consistent with atrophy                                                                              Specimen does not meet the requisition-stated criteria for HPV testing.                           See Pap test interpretation above.                                04/01/2024                      Value:Slide(s) initially screened by GAVIN Roland at [facility] [id number]                           [facility] [id number]                          By the signature on this report, the individual or group listed as making                           the Final Interpretation/Diagnosis certifies that they have reviewed this                           case.     ThinPrep Imaging System 04/01/2024                      Value:This specimen has been analyzed by the ThinPrep Imaging System ([employer] an automated imaging and review system, which assists the                           laboratory in evaluating cells on ThinPrep Pap tests. Following automated                           imaging, selected fields from every slide  were reviewed by a                           cytotechnologist and/or pathologist.                              Educational Note 04/01/2024                      Value:Cervical cytology is a screening procedure primarily for squamous cancers                           and precursors and has associated false-negative and false-positives                           results as evidenced by published data. Your patient's test should be                           interpreted in this context, together with the patient's history and                           clinical findings. Regular sampling and follow-up of unexplained clinical                           signs and symptoms are recommended to minimize false negative results.    Perform HPV HR test? 04/01/2024 Reflex if ASCUS only     Include HPV Genotype? 04/01/2024 Yes     Menstrual status 04/01/2024                      Value:Hysterectomy       Assessment/Plan   The primary encounter diagnosis was Paroxysmal atrial fibrillation (Multi). Diagnoses of Mixed hyperlipidemia, Primary hypertension, Coronary artery disease involving native coronary artery of native heart without angina pectoris, and Bruit of right carotid artery were also pertinent to this visit.    Bruit of right carotid artery  Check ultrasound of carotid arteries bilaterally.  I will have the patient call me the day afterwards and will review results with her and decide whether further follow-up will be needed.    Coronary artery disease involving native coronary artery of native heart without angina pectoris  Stable with no anginal type symptoms.  Will continue standard risk factor modification and will follow on a clinical basis.    HTN (hypertension)  Blood pressure adequately controlled on current regimen, no changes will be made.    Hyperlipidemia  Last LDL cholesterol was 81.  I would like to have the LDL less than 70 if possible.  Will increase the rosuvastatin to 20 mg daily and she states Dr. Carter will  check lipid panels and EXTR.    Paroxysmal atrial fibrillation (Multi)  1 episode of atrial fibrillation following 26 bee stings.  Felt Schrode that the atrial fibrillation was likely just a response to the bee stings and did not recommend anticoagulation therapy.  She has had no palpitations or recurrences.  I instructed her to call or return sooner if she has anything to suggest a reoccurrence of the atrial fibrillation.

## 2024-07-29 NOTE — ASSESSMENT & PLAN NOTE
Stable with no anginal type symptoms.  Will continue standard risk factor modification and will follow on a clinical basis.

## 2024-07-29 NOTE — ASSESSMENT & PLAN NOTE
Last LDL cholesterol was 81.  I would like to have the LDL less than 70 if possible.  Will increase the rosuvastatin to 20 mg daily and she states Dr. Carter will check lipid panels and EXTR.

## 2024-08-13 ENCOUNTER — HOSPITAL ENCOUNTER (OUTPATIENT)
Dept: RADIOLOGY | Facility: HOSPITAL | Age: 74
Discharge: HOME | End: 2024-08-13
Payer: MEDICARE

## 2024-08-13 DIAGNOSIS — R09.89 BRUIT OF RIGHT CAROTID ARTERY: ICD-10-CM

## 2024-08-13 PROCEDURE — 93880 EXTRACRANIAL BILAT STUDY: CPT

## 2024-08-13 PROCEDURE — 93880 EXTRACRANIAL BILAT STUDY: CPT | Performed by: RADIOLOGY

## 2024-08-22 ENCOUNTER — TELEPHONE (OUTPATIENT)
Dept: CARDIOLOGY | Facility: CLINIC | Age: 74
End: 2024-08-22

## 2024-09-16 ENCOUNTER — APPOINTMENT (OUTPATIENT)
Dept: RADIOLOGY | Facility: HOSPITAL | Age: 74
End: 2024-09-16
Payer: MEDICARE

## 2024-10-29 ENCOUNTER — OFFICE VISIT (OUTPATIENT)
Dept: VASCULAR SURGERY | Facility: CLINIC | Age: 74
End: 2024-10-29
Payer: MEDICARE

## 2024-10-29 VITALS
HEART RATE: 69 BPM | WEIGHT: 135 LBS | DIASTOLIC BLOOD PRESSURE: 75 MMHG | OXYGEN SATURATION: 98 % | SYSTOLIC BLOOD PRESSURE: 131 MMHG | BODY MASS INDEX: 23.17 KG/M2

## 2024-10-29 DIAGNOSIS — I65.23 CAROTID STENOSIS, BILATERAL: Primary | ICD-10-CM

## 2024-10-29 PROCEDURE — 3075F SYST BP GE 130 - 139MM HG: CPT | Performed by: SURGERY

## 2024-10-29 PROCEDURE — 3078F DIAST BP <80 MM HG: CPT | Performed by: SURGERY

## 2024-10-29 PROCEDURE — 1123F ACP DISCUSS/DSCN MKR DOCD: CPT | Performed by: SURGERY

## 2024-10-29 PROCEDURE — 1036F TOBACCO NON-USER: CPT | Performed by: SURGERY

## 2024-10-29 PROCEDURE — 3051F HG A1C>EQUAL 7.0%<8.0%: CPT | Performed by: SURGERY

## 2024-10-29 PROCEDURE — 99203 OFFICE O/P NEW LOW 30 MIN: CPT | Performed by: SURGERY

## 2024-10-29 PROCEDURE — 99213 OFFICE O/P EST LOW 20 MIN: CPT | Performed by: SURGERY

## 2024-10-29 PROCEDURE — 1157F ADVNC CARE PLAN IN RCRD: CPT | Performed by: SURGERY

## 2024-10-29 PROCEDURE — 4010F ACE/ARB THERAPY RXD/TAKEN: CPT | Performed by: SURGERY

## 2024-10-29 PROCEDURE — 1159F MED LIST DOCD IN RCRD: CPT | Performed by: SURGERY

## 2024-10-29 PROCEDURE — 3048F LDL-C <100 MG/DL: CPT | Performed by: SURGERY

## 2024-10-29 RX ORDER — OXYBUTYNIN CHLORIDE 5 MG/1
1 TABLET, EXTENDED RELEASE ORAL
COMMUNITY
Start: 2024-03-15

## 2024-10-29 ASSESSMENT — LIFESTYLE VARIABLES
HOW OFTEN DO YOU HAVE SIX OR MORE DRINKS ON ONE OCCASION: LESS THAN MONTHLY
HOW MANY STANDARD DRINKS CONTAINING ALCOHOL DO YOU HAVE ON A TYPICAL DAY: 1 OR 2
SKIP TO QUESTIONS 9-10: 0
HOW OFTEN DO YOU HAVE A DRINK CONTAINING ALCOHOL: MONTHLY OR LESS
AUDIT-C TOTAL SCORE: 2

## 2024-10-29 ASSESSMENT — ENCOUNTER SYMPTOMS
OCCASIONAL FEELINGS OF UNSTEADINESS: 0
LOSS OF SENSATION IN FEET: 0
DEPRESSION: 0

## 2024-11-20 DIAGNOSIS — E11.9 TYPE 2 DIABETES MELLITUS WITHOUT COMPLICATION, WITHOUT LONG-TERM CURRENT USE OF INSULIN (MULTI): Primary | ICD-10-CM

## 2024-11-20 RX ORDER — BLOOD SUGAR DIAGNOSTIC
1 STRIP MISCELLANEOUS DAILY
Qty: 100 STRIP | Refills: 3 | Status: SHIPPED | OUTPATIENT
Start: 2024-11-20 | End: 2025-11-20

## 2024-11-25 ENCOUNTER — OFFICE VISIT (OUTPATIENT)
Dept: VASCULAR SURGERY | Facility: CLINIC | Age: 74
End: 2024-11-25
Payer: MEDICARE

## 2024-11-25 ENCOUNTER — ANCILLARY PROCEDURE (OUTPATIENT)
Dept: VASCULAR MEDICINE | Facility: CLINIC | Age: 74
End: 2024-11-25
Payer: MEDICARE

## 2024-11-25 VITALS
DIASTOLIC BLOOD PRESSURE: 66 MMHG | HEART RATE: 61 BPM | SYSTOLIC BLOOD PRESSURE: 115 MMHG | WEIGHT: 135 LBS | BODY MASS INDEX: 23.17 KG/M2 | OXYGEN SATURATION: 99 %

## 2024-11-25 DIAGNOSIS — I65.23 CAROTID STENOSIS, BILATERAL: Primary | ICD-10-CM

## 2024-11-25 DIAGNOSIS — I65.23 CAROTID STENOSIS, BILATERAL: ICD-10-CM

## 2024-11-25 PROCEDURE — 93880 EXTRACRANIAL BILAT STUDY: CPT | Performed by: SURGERY

## 2024-11-25 PROCEDURE — 4010F ACE/ARB THERAPY RXD/TAKEN: CPT | Performed by: SURGERY

## 2024-11-25 PROCEDURE — 99212 OFFICE O/P EST SF 10 MIN: CPT | Performed by: SURGERY

## 2024-11-25 PROCEDURE — 1123F ACP DISCUSS/DSCN MKR DOCD: CPT | Performed by: SURGERY

## 2024-11-25 PROCEDURE — 3051F HG A1C>EQUAL 7.0%<8.0%: CPT | Performed by: SURGERY

## 2024-11-25 PROCEDURE — 1036F TOBACCO NON-USER: CPT | Performed by: SURGERY

## 2024-11-25 PROCEDURE — 3074F SYST BP LT 130 MM HG: CPT | Performed by: SURGERY

## 2024-11-25 PROCEDURE — 93880 EXTRACRANIAL BILAT STUDY: CPT

## 2024-11-25 PROCEDURE — 1157F ADVNC CARE PLAN IN RCRD: CPT | Performed by: SURGERY

## 2024-11-25 PROCEDURE — 3048F LDL-C <100 MG/DL: CPT | Performed by: SURGERY

## 2024-11-25 PROCEDURE — 3078F DIAST BP <80 MM HG: CPT | Performed by: SURGERY

## 2024-11-25 PROCEDURE — 1159F MED LIST DOCD IN RCRD: CPT | Performed by: SURGERY

## 2024-11-25 ASSESSMENT — LIFESTYLE VARIABLES
AUDIT-C TOTAL SCORE: 2
HOW OFTEN DO YOU HAVE A DRINK CONTAINING ALCOHOL: MONTHLY OR LESS
HOW OFTEN DO YOU HAVE SIX OR MORE DRINKS ON ONE OCCASION: LESS THAN MONTHLY
HOW MANY STANDARD DRINKS CONTAINING ALCOHOL DO YOU HAVE ON A TYPICAL DAY: 1 OR 2
SKIP TO QUESTIONS 9-10: 0

## 2024-11-25 ASSESSMENT — ENCOUNTER SYMPTOMS
OCCASIONAL FEELINGS OF UNSTEADINESS: 0
LOSS OF SENSATION IN FEET: 0
DEPRESSION: 0

## 2024-11-25 ASSESSMENT — PATIENT HEALTH QUESTIONNAIRE - PHQ9
SUM OF ALL RESPONSES TO PHQ9 QUESTIONS 1 AND 2: 0
1. LITTLE INTEREST OR PLEASURE IN DOING THINGS: NOT AT ALL
2. FEELING DOWN, DEPRESSED OR HOPELESS: NOT AT ALL

## 2024-11-25 NOTE — PROGRESS NOTES
Patient returns for follow-up.  She had a carotid duplex today showing a right 50 to 69% stenosis in the left less than 50% stenosis.  She is asymptomatic of stroke.  She has had neck radiation which makes her a very high risk for any surgical intervention.  She does not smoke and does take statin and metformin and daily aspirin.  She remains active despite her disabilities.  Neurologically intact.    I feel very comfortable following up with this patient in 6 months with a carotid duplex.

## 2025-01-13 DIAGNOSIS — E79.0 HYPERURICEMIA: Primary | ICD-10-CM

## 2025-01-13 RX ORDER — ALLOPURINOL 300 MG/1
300 TABLET ORAL DAILY
Qty: 90 TABLET | Refills: 3 | Status: SHIPPED | OUTPATIENT
Start: 2025-01-13 | End: 2026-01-13

## 2025-01-31 DIAGNOSIS — K21.9 CHRONIC GERD: ICD-10-CM

## 2025-02-04 RX ORDER — PANTOPRAZOLE SODIUM 40 MG/1
TABLET, DELAYED RELEASE ORAL
Qty: 90 TABLET | Refills: 3 | Status: SHIPPED | OUTPATIENT
Start: 2025-02-04

## 2025-02-14 DIAGNOSIS — I10 PRIMARY HYPERTENSION: ICD-10-CM

## 2025-02-14 RX ORDER — LOSARTAN POTASSIUM 25 MG/1
25 TABLET ORAL DAILY
Qty: 90 TABLET | Refills: 3 | Status: SHIPPED | OUTPATIENT
Start: 2025-02-14

## 2025-02-24 DIAGNOSIS — E11.9 ALTERED METABOLISM DUE TO DIABETES (MULTI): ICD-10-CM

## 2025-02-24 RX ORDER — METFORMIN HYDROCHLORIDE 500 MG/1
500 TABLET ORAL
Qty: 180 TABLET | Refills: 3 | Status: SHIPPED | OUTPATIENT
Start: 2025-02-24

## 2025-04-04 ENCOUNTER — OFFICE VISIT (OUTPATIENT)
Dept: PRIMARY CARE | Facility: CLINIC | Age: 75
End: 2025-04-04
Payer: MEDICARE

## 2025-04-04 VITALS
HEIGHT: 64 IN | HEART RATE: 78 BPM | DIASTOLIC BLOOD PRESSURE: 62 MMHG | OXYGEN SATURATION: 99 % | BODY MASS INDEX: 23.22 KG/M2 | WEIGHT: 136 LBS | SYSTOLIC BLOOD PRESSURE: 134 MMHG

## 2025-04-04 DIAGNOSIS — E78.2 MIXED HYPERLIPIDEMIA: ICD-10-CM

## 2025-04-04 DIAGNOSIS — Z85.828: ICD-10-CM

## 2025-04-04 DIAGNOSIS — Z12.11 SCREENING FOR COLON CANCER: ICD-10-CM

## 2025-04-04 DIAGNOSIS — R79.9 ABNORMAL FINDING OF BLOOD CHEMISTRY, UNSPECIFIED: ICD-10-CM

## 2025-04-04 DIAGNOSIS — Z78.0 ASYMPTOMATIC MENOPAUSE: ICD-10-CM

## 2025-04-04 DIAGNOSIS — Z13.6 ENCOUNTER FOR SCREENING FOR CARDIOVASCULAR DISORDERS: ICD-10-CM

## 2025-04-04 DIAGNOSIS — E79.0 HYPERURICEMIA: ICD-10-CM

## 2025-04-04 DIAGNOSIS — E55.9 VITAMIN D DEFICIENCY: ICD-10-CM

## 2025-04-04 DIAGNOSIS — I10 PRIMARY HYPERTENSION: ICD-10-CM

## 2025-04-04 DIAGNOSIS — K21.9 CHRONIC GERD: ICD-10-CM

## 2025-04-04 DIAGNOSIS — M51.16 LUMBAR DISC DISEASE WITH RADICULOPATHY: ICD-10-CM

## 2025-04-04 DIAGNOSIS — Z98.890: ICD-10-CM

## 2025-04-04 DIAGNOSIS — E11.9 ALTERED METABOLISM DUE TO DIABETES (MULTI): ICD-10-CM

## 2025-04-04 DIAGNOSIS — Z00.00 MEDICARE ANNUAL WELLNESS VISIT, SUBSEQUENT: Primary | ICD-10-CM

## 2025-04-04 DIAGNOSIS — Z13.29 SCREENING FOR THYROID DISORDER: ICD-10-CM

## 2025-04-04 DIAGNOSIS — Z13.1 SCREENING FOR DIABETES MELLITUS: ICD-10-CM

## 2025-04-04 DIAGNOSIS — Z11.59 SCREENING FOR VIRAL DISEASE: ICD-10-CM

## 2025-04-04 DIAGNOSIS — Z00.00 WELLNESS EXAMINATION: ICD-10-CM

## 2025-04-04 PROCEDURE — 99214 OFFICE O/P EST MOD 30 MIN: CPT | Mod: 25 | Performed by: FAMILY MEDICINE

## 2025-04-04 PROCEDURE — 99397 PER PM REEVAL EST PAT 65+ YR: CPT | Performed by: FAMILY MEDICINE

## 2025-04-04 PROCEDURE — 99215 OFFICE O/P EST HI 40 MIN: CPT | Performed by: FAMILY MEDICINE

## 2025-04-04 RX ORDER — LOSARTAN POTASSIUM 25 MG/1
25 TABLET ORAL DAILY
Qty: 90 TABLET | Refills: 3 | Status: SHIPPED | OUTPATIENT
Start: 2025-04-04

## 2025-04-04 RX ORDER — ALLOPURINOL 300 MG/1
300 TABLET ORAL DAILY
Qty: 90 TABLET | Refills: 3 | Status: SHIPPED | OUTPATIENT
Start: 2025-04-04 | End: 2026-04-04

## 2025-04-04 RX ORDER — METFORMIN HYDROCHLORIDE 500 MG/1
500 TABLET ORAL
Qty: 180 TABLET | Refills: 3 | Status: SHIPPED | OUTPATIENT
Start: 2025-04-04

## 2025-04-04 RX ORDER — PANTOPRAZOLE SODIUM 40 MG/1
40 TABLET, DELAYED RELEASE ORAL
Qty: 90 TABLET | Refills: 0 | Status: SHIPPED | OUTPATIENT
Start: 2025-04-04 | End: 2025-07-03

## 2025-04-04 RX ORDER — METOPROLOL TARTRATE 25 MG/1
25 TABLET, FILM COATED ORAL
COMMUNITY

## 2025-04-04 RX ORDER — ACETAMINOPHEN AND CODEINE PHOSPHATE 300; 60 MG/1; MG/1
0.5 TABLET ORAL 2 TIMES DAILY PRN
Qty: 10 TABLET | Refills: 0 | Status: SHIPPED | OUTPATIENT
Start: 2025-04-04 | End: 2025-04-14

## 2025-04-04 RX ORDER — LIDOCAINE 50 MG/G
1 PATCH TOPICAL DAILY
Qty: 30 PATCH | Refills: 2 | Status: SHIPPED | OUTPATIENT
Start: 2025-04-04 | End: 2025-05-04

## 2025-04-04 RX ORDER — ROSUVASTATIN CALCIUM 20 MG/1
20 TABLET, COATED ORAL DAILY
Qty: 90 TABLET | Refills: 3 | Status: SHIPPED | OUTPATIENT
Start: 2025-04-04 | End: 2026-04-04

## 2025-04-04 ASSESSMENT — ACTIVITIES OF DAILY LIVING (ADL)
DRESSING: INDEPENDENT
MANAGING_FINANCES: INDEPENDENT
GROCERY_SHOPPING: INDEPENDENT
BATHING: INDEPENDENT
DOING_HOUSEWORK: INDEPENDENT
TAKING_MEDICATION: INDEPENDENT

## 2025-04-04 ASSESSMENT — ENCOUNTER SYMPTOMS
DEPRESSION: 0
OCCASIONAL FEELINGS OF UNSTEADINESS: 0
LOSS OF SENSATION IN FEET: 0

## 2025-04-04 ASSESSMENT — PAIN SCALES - GENERAL: PAINLEVEL_OUTOF10: 2

## 2025-04-04 NOTE — PROGRESS NOTES
74-year-old presents to establish care for Medicare wellness visit physical follow chronic medical conditions    Health Maintenance:  Eats a standard American diet.  Exercises regularly.  Does not drink, smoke, use illicit substances.  Due for Colonoscopy and Otherwise up-to-date on all routine health maintenance screenings.  Due for immunizations.  Due for yearly lab work.      1. Medicare annual wellness visit, subsequent    2. Wellness examination    3. Abnormal finding of blood chemistry, unspecified    4. Screening for thyroid disorder    5. Screening for diabetes mellitus    6. Screening for viral disease    7. Encounter for screening for cardiovascular disorders    8. Vitamin D deficiency    9. Mixed hyperlipidemia    10. Hyperuricemia    11. Altered metabolism due to diabetes (Multi)    12. Primary hypertension    13. Chronic GERD    14. Asymptomatic menopause    15. History of surgical removal of squamous cell carcinoma of skin of left temple    16. Screening for colon cancer    17. Lumbar disc disease with radiculopathy        Hyperlipidemia:  Currently on Crestor 20 mg needs LDL rechecked no chest pain or shortness of breath    Diabetes: Currently only on metformin has been noticing a singular episode where she her blood sugar was going low and she has been eating excessive amounts of sugar recently to keep her blood sugar higher she is scared of going hypoglycemic.  Currently only on metformin.  Last A1c of 7.11-year ago.    GERD:  Well-controlled on PPI    Lumbar disc disease with radiculopathy:  Patient not currently on any chronic therapy for this.  Her previous primary doctor would give her a very short supply of Tylenol with codeine that would last her over a year for severe exacerbations of back pain.  Recently tried a Medrol pack which caused side effects of dizziness and so she stopped the medication.  Never trialed Zanaflex.    Attention:  Currently on losartan blood pressure 134/62    All  pertinent positive symptoms are included in history of present illness.    All other systems have been reviewed and are negative and noncontributory to this patient's current ailments.    CONSTITUTIONAL - INAD. Not ill appearing.  SKIN - No lesions or rashes visualized. Good skin turgor.  HEENT- Head is atraumatic and normocephalic. Nasal turbinates are nonerythematous and without drainage. .  RESP - CTAB. No wheezing, rhonchi, or crackles.   CARDIAC - RRR. No murmurs, gallops, or rubs.  ABDOMEN - Soft, nontender, nondistended. No organomegaly.  NEURO - CNs 2-12 grossly intact.  PSYCH - Normal mood and affect        1. Medicare annual wellness visit, subsequent (Primary)  Cardiovascular disease discussion was had including discussions of chronic medical conditions such as hypertension, hyperlipidemia, CAD, or others. 15 minutes was spent in discussion.  and Depression screening was completed. 5 minutes was spent in this discussion.    - CBC and Auto Differential; Future  - Comprehensive Metabolic Panel; Future  - Lipid Panel; Future  - Hemoglobin A1C; Future  - TSH with reflex to Free T4 if abnormal; Future  - Hepatitis C antibody; Future  - Albumin-Creatinine Ratio, Urine Random; Future  - Measles (Rubeola) Antibody, IgG; Future  - CBC and Auto Differential  - Comprehensive Metabolic Panel  - Lipid Panel  - Hemoglobin A1C  - TSH with reflex to Free T4 if abnormal  - Hepatitis C antibody  - Measles (Rubeola) Antibody, IgG    2. Wellness examination  Health and wellness topics discussed today.  Recommended eating a varied and healthy diet and made dietary recommendations, also discussed exercise and exercising regularly 30 minutes a day 3 days a week.  Immunizations recommended and updated.  Health screening guidelines discussed with patient including possible things such as colonoscopies, mammograms, prostate screenings, lung cancer screenings, bone densitometry, and other wellness topics.  Yearly lab work ordered  or reviewed today.  - CBC and Auto Differential; Future  - Comprehensive Metabolic Panel; Future  - Lipid Panel; Future  - Hemoglobin A1C; Future  - TSH with reflex to Free T4 if abnormal; Future  - Hepatitis C antibody; Future  - Albumin-Creatinine Ratio, Urine Random; Future  - Measles (Rubeola) Antibody, IgG; Future  - CBC and Auto Differential  - Comprehensive Metabolic Panel  - Lipid Panel  - Hemoglobin A1C  - TSH with reflex to Free T4 if abnormal  - Hepatitis C antibody  - Measles (Rubeola) Antibody, IgG    3. Abnormal finding of blood chemistry, unspecified    - CBC and Auto Differential; Future  - Comprehensive Metabolic Panel; Future  - Lipid Panel; Future  - Hemoglobin A1C; Future  - TSH with reflex to Free T4 if abnormal; Future  - Hepatitis C antibody; Future  - Albumin-Creatinine Ratio, Urine Random; Future  - Measles (Rubeola) Antibody, IgG; Future  - CBC and Auto Differential  - Comprehensive Metabolic Panel  - Lipid Panel  - Hemoglobin A1C  - TSH with reflex to Free T4 if abnormal  - Hepatitis C antibody  - Measles (Rubeola) Antibody, IgG    4. Screening for thyroid disorder    - CBC and Auto Differential; Future  - Comprehensive Metabolic Panel; Future  - Lipid Panel; Future  - Hemoglobin A1C; Future  - TSH with reflex to Free T4 if abnormal; Future  - Hepatitis C antibody; Future  - Albumin-Creatinine Ratio, Urine Random; Future  - Measles (Rubeola) Antibody, IgG; Future  - CBC and Auto Differential  - Comprehensive Metabolic Panel  - Lipid Panel  - Hemoglobin A1C  - TSH with reflex to Free T4 if abnormal  - Hepatitis C antibody  - Measles (Rubeola) Antibody, IgG    5. Screening for diabetes mellitus    - CBC and Auto Differential; Future  - Comprehensive Metabolic Panel; Future  - Lipid Panel; Future  - Hemoglobin A1C; Future  - TSH with reflex to Free T4 if abnormal; Future  - Hepatitis C antibody; Future  - Albumin-Creatinine Ratio, Urine Random; Future  - Measles (Rubeola) Antibody, IgG;  Future  - CBC and Auto Differential  - Comprehensive Metabolic Panel  - Lipid Panel  - Hemoglobin A1C  - TSH with reflex to Free T4 if abnormal  - Hepatitis C antibody  - Measles (Rubeola) Antibody, IgG    6. Screening for viral disease    - CBC and Auto Differential; Future  - Comprehensive Metabolic Panel; Future  - Lipid Panel; Future  - Hemoglobin A1C; Future  - TSH with reflex to Free T4 if abnormal; Future  - Hepatitis C antibody; Future  - Albumin-Creatinine Ratio, Urine Random; Future  - Measles (Rubeola) Antibody, IgG; Future  - CBC and Auto Differential  - Comprehensive Metabolic Panel  - Lipid Panel  - Hemoglobin A1C  - TSH with reflex to Free T4 if abnormal  - Hepatitis C antibody  - Measles (Rubeola) Antibody, IgG    7. Encounter for screening for cardiovascular disorders    - CBC and Auto Differential; Future  - Comprehensive Metabolic Panel; Future  - Lipid Panel; Future  - Hemoglobin A1C; Future  - TSH with reflex to Free T4 if abnormal; Future  - Hepatitis C antibody; Future  - Albumin-Creatinine Ratio, Urine Random; Future  - Measles (Rubeola) Antibody, IgG; Future  - CBC and Auto Differential  - Comprehensive Metabolic Panel  - Lipid Panel  - Hemoglobin A1C  - TSH with reflex to Free T4 if abnormal  - Hepatitis C antibody  - Measles (Rubeola) Antibody, IgG    8. Vitamin D deficiency    - CBC and Auto Differential; Future  - Comprehensive Metabolic Panel; Future  - Lipid Panel; Future  - Hemoglobin A1C; Future  - TSH with reflex to Free T4 if abnormal; Future  - Vitamin D 25-Hydroxy,Total (for eval of Vitamin D levels); Future  - Hepatitis C antibody; Future  - Albumin-Creatinine Ratio, Urine Random; Future  - Measles (Rubeola) Antibody, IgG; Future  - CBC and Auto Differential  - Comprehensive Metabolic Panel  - Lipid Panel  - Hemoglobin A1C  - TSH with reflex to Free T4 if abnormal  - Vitamin D 25-Hydroxy,Total (for eval of Vitamin D levels)  - Hepatitis C antibody  - Measles (Rubeola) Antibody,  IgG    9. Mixed hyperlipidemia  Discussed with patient about the natural history and course of hyperlipidemia.  Discussed lifestyle as well as genetic implications of hyperlipidemia.      Discussed possible treatment options of hyperlipidemia including intensive lifestyle interventions including lower carbohydrate, lower saturated fat diet as well as increasing aerobic and resistance training exercise to at least 30 minutes daily 3 times a week, hopefully more.    Discussed medication options including the use of statin medications as well as the side effects of these medications.  Discussed goal LDL of <100 for primary prevention and <70 for secondary prevention.    Discussed the cost benefit analysis of lowering cholesterol and how it will help prevent heart attacks and strokes in the future, also discussed the benefit of statin medications and the large amount of studies showing a reduction in morbidity mortality with the use of a statin medication in the setting of multiple risk factors for heart attacks and strokes.    Discussed secondary risk stratification with CT Cardiac Scoring and utilizing the PREVENT calculator to determine if statin use with actually benefit patients.     If medication was prescribed or continued, the appropriate lab work was ordered.  - CBC and Auto Differential; Future  - Comprehensive Metabolic Panel; Future  - Lipid Panel; Future  - Hemoglobin A1C; Future  - TSH with reflex to Free T4 if abnormal; Future  - rosuvastatin (Crestor) 20 mg tablet; Take 1 tablet (20 mg) by mouth once daily.  Dispense: 90 tablet; Refill: 3  - Hepatitis C antibody; Future  - Albumin-Creatinine Ratio, Urine Random; Future  - Measles (Rubeola) Antibody, IgG; Future  - CBC and Auto Differential  - Comprehensive Metabolic Panel  - Lipid Panel  - Hemoglobin A1C  - TSH with reflex to Free T4 if abnormal  - Hepatitis C antibody  - Measles (Rubeola) Antibody, IgG    10. Hyperuricemia  Spoke extensively about the  natural history of hyperuricemia and gout.   Spoke with the diagnostic information obtained including uric acid level as well as possible arthrocentesis of the gouty joint to look for crystals.  Spoke with the differential diagnosis including gout, pseudogout, rheumatoid arthritis, and osteoarthritis.  Spoke about the treatment for hyperuricemia including lifestyle interventions including a urate lowering diet.  Spoke with the different medications used to treat hyperuricemia including urate lowering therapies such as allopurinol and probenecid.  Spoke with the treatment for acute gout attacks such as colchicine, NSAIDs, and steroids.    Patient made an informed decision about diagnosing and/or treating this at this time.  - CBC and Auto Differential; Future  - Comprehensive Metabolic Panel; Future  - Lipid Panel; Future  - Hemoglobin A1C; Future  - TSH with reflex to Free T4 if abnormal; Future  - allopurinol (Zyloprim) 300 mg tablet; Take 1 tablet (300 mg) by mouth once daily.  Dispense: 90 tablet; Refill: 3  - Hepatitis C antibody; Future  - Albumin-Creatinine Ratio, Urine Random; Future  - Measles (Rubeola) Antibody, IgG; Future  - Uric acid; Future  - CBC and Auto Differential  - Comprehensive Metabolic Panel  - Lipid Panel  - Hemoglobin A1C  - TSH with reflex to Free T4 if abnormal  - Hepatitis C antibody  - Measles (Rubeola) Antibody, IgG  - Uric acid    11. Altered metabolism due to diabetes (Multi)  Spoke extensively about the natural course and history of type 2 diabetes.    Spoke about how glucose in the blood causes blood vessel wall damage and can lead to end-organ damage and how it can damage the blood vessels that supply the nerves causing polyneuropathy, that supply the kidneys causing kidney failure, that supply the heart causing heart attacks, and that supply the brain causing strokes.  Spoke about the need to lower blood sugar is much as possible by interventions such as limiting carbohydrate  intake, exercising, as well as using medications to remove glucose from the blood.    Discussion was had about the role of medications as well as lifestyle interventions and, my personal recommendation is an intensive lifestyle intervention including dietary and exercise programs to help reduce the need for medications in the future.  My personal recommendation for all diabetics is to undertake a very low carbohydrate diet such as a ketogenic diet and this may have been discussed with the patient if appropriate.    Conversation may have included information about medications such as metformin, SGLT2 inhibitors, DPP 4 inhibitors, GLP-1 agonitsts, sulfonylureas, and insulin, the side effects and expected benefits of each recommended medication, and the recommended monitoring for each.    Recommendations made to the patient included optimal medical therapy including recommending an ACE/ARB for renal protection as well as statin therapy for prevention of heart attacks and strokes.  Blood pressure goal of 130/80.  Recommend patient follow-up every 3 months for A1c checks and appropriate examinations as well as treatment protocol adjustments.   Appropriate lab work was ordered today.  - CBC and Auto Differential; Future  - Comprehensive Metabolic Panel; Future  - Lipid Panel; Future  - Hemoglobin A1C; Future  - TSH with reflex to Free T4 if abnormal; Future  - metFORMIN (Glucophage) 500 mg tablet; Take 1 tablet (500 mg) by mouth 2 times daily (morning and late afternoon).  Dispense: 180 tablet; Refill: 3  - Hepatitis C antibody; Future  - Albumin-Creatinine Ratio, Urine Random; Future  - Measles (Rubeola) Antibody, IgG; Future  - CBC and Auto Differential  - Comprehensive Metabolic Panel  - Lipid Panel  - Hemoglobin A1C  - TSH with reflex to Free T4 if abnormal  - Hepatitis C antibody  - Measles (Rubeola) Antibody, IgG    12. Primary hypertension  Had a discussion with the patient about hypertension.  Discussed the  natural history and course of hypertension and need for controlling blood pressure.  Discussed the cost benefit of treating hypertension with medication and how lowering blood pressure to goal levels will help reduce the risk of heart attacks and strokes in the future.     Discussed lifestyle interventions including regular cardio aerobic exercise 30 minutes daily at least 3 times a week, hopefully more.  Discussed dietary interventions to help lower blood pressure.    Recommend patient get a blood pressure cuff and begin measuring blood pressure at home and keeping a log.  Advised the patient bring the log with them at their next visit so that we can find out the average blood pressure reading and determine whether or not the treatment is working.    If patient is already taking medication, I recommended continuing or changing medication depending on blood pressure control.    Appropriate lab work was ordered.  - CBC and Auto Differential; Future  - Comprehensive Metabolic Panel; Future  - Lipid Panel; Future  - Hemoglobin A1C; Future  - TSH with reflex to Free T4 if abnormal; Future  - losartan (Cozaar) 25 mg tablet; Take 1 tablet (25 mg) by mouth once daily.  Dispense: 90 tablet; Refill: 3  - Hepatitis C antibody; Future  - Albumin-Creatinine Ratio, Urine Random; Future  - Measles (Rubeola) Antibody, IgG; Future  - aspirin 81 mg capsule; Take 81 mg by mouth once daily.  Dispense: 90 capsule; Refill: 3  - CBC and Auto Differential  - Comprehensive Metabolic Panel  - Lipid Panel  - Hemoglobin A1C  - TSH with reflex to Free T4 if abnormal  - Hepatitis C antibody  - Measles (Rubeola) Antibody, IgG    13. Chronic GERD  Well-controlled  - CBC and Auto Differential; Future  - Comprehensive Metabolic Panel; Future  - Lipid Panel; Future  - Hemoglobin A1C; Future  - TSH with reflex to Free T4 if abnormal; Future  - Hepatitis C antibody; Future  - pantoprazole (ProtoNix) 40 mg EC tablet; Take 1 tablet (40 mg) by mouth once  daily in the morning. Take before meals. Do not crush, chew, or split.  Dispense: 90 tablet; Refill: 0  - Albumin-Creatinine Ratio, Urine Random; Future  - Measles (Rubeola) Antibody, IgG; Future  - CBC and Auto Differential  - Comprehensive Metabolic Panel  - Lipid Panel  - Hemoglobin A1C  - TSH with reflex to Free T4 if abnormal  - Hepatitis C antibody  - Measles (Rubeola) Antibody, IgG    14. Asymptomatic menopause    - XR DEXA bone density; Future  - Hepatitis C antibody; Future  - Albumin-Creatinine Ratio, Urine Random; Future  - Measles (Rubeola) Antibody, IgG; Future  - Hepatitis C antibody  - Measles (Rubeola) Antibody, IgG    15. History of surgical removal of squamous cell carcinoma of skin of left temple    - Referral to Dermatology  - Measles (Rubeola) Antibody, IgG; Future  - Measles (Rubeola) Antibody, IgG    16. Screening for colon cancer    - Cologuard® colon cancer screening; Future  - Measles (Rubeola) Antibody, IgG; Future  - Cologuard® colon cancer screening  - Measles (Rubeola) Antibody, IgG    17. Lumbar disc disease with radiculopathy  Continue lidocaine patches.  Had a long discussion with the patient about the risks of opioid use and I believe that her current use of 10 tablets over the course of an entire year utilizing half a tablet at a time as needed for severe pain could be considered appropriate.  I see no signs of abuse misuse or diversion or signs of dependence with the use of this medication however I would recommend patient trial more conservative treatments including physical therapy, possibly gabapentin, and other modalities for the treatment of her back pain as opposed to relying on as needed opioid pain medication.  I have agreed to fill this medication once for this patient however for future fills I would likely not agree to continue prescribing his medication until patient trials conservative treatment including physical therapy and alternative modalities    - lidocaine  (Lidoderm) 5 % patch; Place 1 patch over 12 hours on the skin once daily.  Dispense: 30 patch; Refill: 2  - acetaminophen-codeine (Tylenol w/ Codeine #4) 300-60 mg tablet; Take 0.5 tablets by mouth 2 times a day as needed for severe pain (7 - 10) for up to 10 days.  Dispense: 10 tablet; Refill: 0

## 2025-04-07 DIAGNOSIS — E11.9 ALTERED METABOLISM DUE TO DIABETES (MULTI): ICD-10-CM

## 2025-04-07 DIAGNOSIS — I10 PRIMARY HYPERTENSION: ICD-10-CM

## 2025-04-07 DIAGNOSIS — E78.2 MIXED HYPERLIPIDEMIA: ICD-10-CM

## 2025-04-07 DIAGNOSIS — E79.0 HYPERURICEMIA: ICD-10-CM

## 2025-04-07 DIAGNOSIS — K21.9 CHRONIC GERD: ICD-10-CM

## 2025-04-08 LAB
25(OH)D3+25(OH)D2 SERPL-MCNC: 15 NG/ML (ref 30–100)
ALBUMIN SERPL-MCNC: 4.8 G/DL (ref 3.6–5.1)
ALP SERPL-CCNC: 78 U/L (ref 37–153)
ALT SERPL-CCNC: 39 U/L (ref 6–29)
ANION GAP SERPL CALCULATED.4IONS-SCNC: 13 MMOL/L (CALC) (ref 7–17)
AST SERPL-CCNC: 42 U/L (ref 10–35)
BASOPHILS # BLD AUTO: 58 CELLS/UL (ref 0–200)
BASOPHILS NFR BLD AUTO: 1.1 %
BILIRUB SERPL-MCNC: 0.6 MG/DL (ref 0.2–1.2)
BUN SERPL-MCNC: 12 MG/DL (ref 7–25)
CALCIUM SERPL-MCNC: 9.8 MG/DL (ref 8.6–10.4)
CHLORIDE SERPL-SCNC: 104 MMOL/L (ref 98–110)
CHOLEST SERPL-MCNC: 176 MG/DL
CHOLEST/HDLC SERPL: 3.1 (CALC)
CO2 SERPL-SCNC: 26 MMOL/L (ref 20–32)
CREAT SERPL-MCNC: 0.91 MG/DL (ref 0.6–1)
EGFRCR SERPLBLD CKD-EPI 2021: 67 ML/MIN/1.73M2
EOSINOPHIL # BLD AUTO: 228 CELLS/UL (ref 15–500)
EOSINOPHIL NFR BLD AUTO: 4.3 %
ERYTHROCYTE [DISTWIDTH] IN BLOOD BY AUTOMATED COUNT: 14.4 % (ref 11–15)
EST. AVERAGE GLUCOSE BLD GHB EST-MCNC: 151 MG/DL
EST. AVERAGE GLUCOSE BLD GHB EST-SCNC: 8.4 MMOL/L
GLUCOSE SERPL-MCNC: 124 MG/DL (ref 65–99)
HBA1C MFR BLD: 6.9 % OF TOTAL HGB
HCT VFR BLD AUTO: 46.9 % (ref 35–45)
HCV AB SERPL QL IA: NORMAL
HDLC SERPL-MCNC: 56 MG/DL
HGB BLD-MCNC: 15 G/DL (ref 11.7–15.5)
LDLC SERPL CALC-MCNC: 96 MG/DL (CALC)
LYMPHOCYTES # BLD AUTO: 822 CELLS/UL (ref 850–3900)
LYMPHOCYTES NFR BLD AUTO: 15.5 %
MCH RBC QN AUTO: 29.9 PG (ref 27–33)
MCHC RBC AUTO-ENTMCNC: 32 G/DL (ref 32–36)
MCV RBC AUTO: 93.4 FL (ref 80–100)
MEV IGG SER IA-ACNC: >300 AU/ML
MONOCYTES # BLD AUTO: 329 CELLS/UL (ref 200–950)
MONOCYTES NFR BLD AUTO: 6.2 %
NEUTROPHILS # BLD AUTO: 3864 CELLS/UL (ref 1500–7800)
NEUTROPHILS NFR BLD AUTO: 72.9 %
NONHDLC SERPL-MCNC: 120 MG/DL (CALC)
PLATELET # BLD AUTO: 250 THOUSAND/UL (ref 140–400)
PMV BLD REES-ECKER: 11.4 FL (ref 7.5–12.5)
POTASSIUM SERPL-SCNC: 4.3 MMOL/L (ref 3.5–5.3)
PROT SERPL-MCNC: 7 G/DL (ref 6.1–8.1)
RBC # BLD AUTO: 5.02 MILLION/UL (ref 3.8–5.1)
SODIUM SERPL-SCNC: 143 MMOL/L (ref 135–146)
TRIGL SERPL-MCNC: 144 MG/DL
TSH SERPL-ACNC: 3.29 MIU/L (ref 0.4–4.5)
URATE SERPL-MCNC: 3.4 MG/DL (ref 2.5–7)
WBC # BLD AUTO: 5.3 THOUSAND/UL (ref 3.8–10.8)

## 2025-04-08 RX ORDER — LOSARTAN POTASSIUM 25 MG/1
25 TABLET ORAL DAILY
Qty: 90 TABLET | Refills: 3 | Status: SHIPPED | OUTPATIENT
Start: 2025-04-08

## 2025-04-08 RX ORDER — ALLOPURINOL 300 MG/1
300 TABLET ORAL DAILY
Qty: 90 TABLET | Refills: 3 | Status: SHIPPED | OUTPATIENT
Start: 2025-04-08 | End: 2026-04-08

## 2025-04-08 RX ORDER — ROSUVASTATIN CALCIUM 20 MG/1
20 TABLET, COATED ORAL DAILY
Qty: 90 TABLET | Refills: 3 | Status: SHIPPED | OUTPATIENT
Start: 2025-04-08 | End: 2026-04-08

## 2025-04-08 RX ORDER — METFORMIN HYDROCHLORIDE 500 MG/1
500 TABLET ORAL
Qty: 180 TABLET | Refills: 3 | Status: SHIPPED | OUTPATIENT
Start: 2025-04-08

## 2025-04-08 RX ORDER — PANTOPRAZOLE SODIUM 40 MG/1
40 TABLET, DELAYED RELEASE ORAL
Qty: 90 TABLET | Refills: 0 | Status: SHIPPED | OUTPATIENT
Start: 2025-04-08 | End: 2025-07-07

## 2025-04-23 ENCOUNTER — HOSPITAL ENCOUNTER (OUTPATIENT)
Dept: RADIOLOGY | Facility: CLINIC | Age: 75
Discharge: HOME | End: 2025-04-23
Payer: MEDICARE

## 2025-04-23 DIAGNOSIS — Z78.0 ASYMPTOMATIC MENOPAUSE: ICD-10-CM

## 2025-04-23 PROCEDURE — 77080 DXA BONE DENSITY AXIAL: CPT

## 2025-04-23 PROCEDURE — 77080 DXA BONE DENSITY AXIAL: CPT | Performed by: RADIOLOGY

## 2025-04-23 NOTE — LETTER
April 24, 2025     Damari RANKIN Kurt  [address] [address]      Dear Ms. Hicks:    Below are the results from your recent visit:    Resulted Orders   XR DEXA bone density    Narrative    Interpreted By:  Mayco Bryant,   STUDY:  DEXA BONE DENSITY4/23/2025 2:42 pm      INDICATION:  Signs/Symptoms:screen. The patient is a 76 y/o  year old F.      ,Z78.0 Asymptomatic menopausal state      COMPARISON:  12/03/2009      ACCESSION NUMBER(S):  [id number]      ORDERING CLINICIAN:  MANAV SAGE      TECHNIQUE:  DEXA BONE DENSITY      FINDINGS:  SPINE L1-L4  Bone Mineral Density: 0.751 g/cm2  T-Score -3.2  Z-Score -0.7  Bone Mineral Density change vs baseline:  -16.4 %  Bone Mineral Density change vs previous: -16.4 %      LEFT FEMUR -TOTAL  Bone Mineral Density: 0.539 g/cm2  T-Score -3.3   Z-Score  -1.6  Bone Mineral Density change vs baseline: -34.8 %  Bone Mineral Density change vs previous: -34.8 %      LEFT FEMUR -NECK  Bone Mineral Density: 0.443 g/cm2  T-Score -3.7  Z-Score -1.6          World Health Organization (WHO) criteria for post-menopausal,   Women:  Normal:         T-score at or above -1 SD  Osteopenia:   T-score between -1 and -2.5 SD  Osteoporosis: T-score at or below -2.5 SD          10-year Fracture Risk:  Major Osteoporotic Fracture  49 %  Hip Fracture                        38 %      Note:  If no FRAX score is reported, it is because:  Some T-score for Spine Total or Hip Total or Femoral Neck at or below  -2.5        Impression    DEXA:  There is osteoporosis of the lumbar spine and left hip.      All images and detailed analysis are available on the  Radiology  PACS.      MACRO:  None      Signed by: Mayco Bryant 4/23/2025 3:43 PM  Dictation workstation:   [id number]     The test results show:   Manav Sage DO  4/23/2025  4:21 PM EDT Back to Top      DEXA scan reveals osteoporosis, patient not currently on therapy.  Treatment for osteoporosis is  available if patient desires, can make an appointment to discuss if desired       If you have any questions or concerns, please don't hesitate to call.         Sincerely,        JAYNE MENTOR DXA

## 2025-04-23 NOTE — RESULT ENCOUNTER NOTE
DEXA scan reveals osteoporosis, patient not currently on therapy.  Treatment for osteoporosis is available if patient desires, can make an appointment to discuss if desired

## 2025-04-24 ENCOUNTER — TELEPHONE (OUTPATIENT)
Dept: PRIMARY CARE | Facility: CLINIC | Age: 75
End: 2025-04-24
Payer: MEDICARE

## 2025-04-30 LAB — NONINV COLON CA DNA+OCC BLD SCRN STL QL: NEGATIVE

## 2025-06-03 ENCOUNTER — OFFICE VISIT (OUTPATIENT)
Dept: VASCULAR SURGERY | Facility: CLINIC | Age: 75
End: 2025-06-03
Payer: MEDICARE

## 2025-06-03 ENCOUNTER — ANCILLARY PROCEDURE (OUTPATIENT)
Dept: VASCULAR MEDICINE | Facility: CLINIC | Age: 75
End: 2025-06-03
Payer: MEDICARE

## 2025-06-03 VITALS
HEIGHT: 64 IN | SYSTOLIC BLOOD PRESSURE: 119 MMHG | DIASTOLIC BLOOD PRESSURE: 68 MMHG | WEIGHT: 136.5 LBS | HEART RATE: 62 BPM | BODY MASS INDEX: 23.31 KG/M2

## 2025-06-03 DIAGNOSIS — I65.23 CAROTID STENOSIS, BILATERAL: Primary | ICD-10-CM

## 2025-06-03 DIAGNOSIS — I65.23 CAROTID STENOSIS, BILATERAL: ICD-10-CM

## 2025-06-03 PROCEDURE — 3008F BODY MASS INDEX DOCD: CPT | Performed by: SURGERY

## 2025-06-03 PROCEDURE — 4010F ACE/ARB THERAPY RXD/TAKEN: CPT | Performed by: SURGERY

## 2025-06-03 PROCEDURE — 99212 OFFICE O/P EST SF 10 MIN: CPT | Performed by: SURGERY

## 2025-06-03 PROCEDURE — 3074F SYST BP LT 130 MM HG: CPT | Performed by: SURGERY

## 2025-06-03 PROCEDURE — 3078F DIAST BP <80 MM HG: CPT | Performed by: SURGERY

## 2025-06-03 PROCEDURE — 1125F AMNT PAIN NOTED PAIN PRSNT: CPT | Performed by: SURGERY

## 2025-06-03 PROCEDURE — 1159F MED LIST DOCD IN RCRD: CPT | Performed by: SURGERY

## 2025-06-03 PROCEDURE — 93880 EXTRACRANIAL BILAT STUDY: CPT

## 2025-06-03 PROCEDURE — 93880 EXTRACRANIAL BILAT STUDY: CPT | Performed by: SURGERY

## 2025-06-03 PROCEDURE — 1036F TOBACCO NON-USER: CPT | Performed by: SURGERY

## 2025-06-03 ASSESSMENT — PAIN SCALES - GENERAL: PAINLEVEL_OUTOF10: 5
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
## 2025-06-27 ENCOUNTER — APPOINTMENT (OUTPATIENT)
Dept: PRIMARY CARE | Facility: CLINIC | Age: 75
End: 2025-06-27
Payer: MEDICARE

## 2025-07-17 ENCOUNTER — OFFICE VISIT (OUTPATIENT)
Dept: PRIMARY CARE | Facility: CLINIC | Age: 75
End: 2025-07-17
Payer: MEDICARE

## 2025-07-17 VITALS
HEART RATE: 63 BPM | WEIGHT: 131 LBS | OXYGEN SATURATION: 99 % | BODY MASS INDEX: 22.36 KG/M2 | DIASTOLIC BLOOD PRESSURE: 64 MMHG | SYSTOLIC BLOOD PRESSURE: 116 MMHG | HEIGHT: 64 IN

## 2025-07-17 DIAGNOSIS — E11.9 ALTERED METABOLISM DUE TO DIABETES (MULTI): ICD-10-CM

## 2025-07-17 DIAGNOSIS — R74.01 TRANSAMINASEMIA: ICD-10-CM

## 2025-07-17 DIAGNOSIS — E78.2 MIXED HYPERLIPIDEMIA: Primary | ICD-10-CM

## 2025-07-17 DIAGNOSIS — I48.0 PAROXYSMAL ATRIAL FIBRILLATION (MULTI): ICD-10-CM

## 2025-07-17 DIAGNOSIS — E79.0 HYPERURICEMIA: ICD-10-CM

## 2025-07-17 DIAGNOSIS — I10 PRIMARY HYPERTENSION: ICD-10-CM

## 2025-07-17 PROBLEM — R51.9 HEADACHE, UNSPECIFIED: Status: ACTIVE | Noted: 2025-07-17

## 2025-07-17 PROBLEM — M79.603 PAIN IN UPPER LIMB: Status: ACTIVE | Noted: 2025-07-17

## 2025-07-17 PROBLEM — R07.89 CHEST DISCOMFORT: Status: ACTIVE | Noted: 2025-07-17

## 2025-07-17 PROBLEM — E78.5 DYSLIPIDEMIA: Status: ACTIVE | Noted: 2025-07-17

## 2025-07-17 PROBLEM — I48.19 OTHER PERSISTENT ATRIAL FIBRILLATION: Status: RESOLVED | Noted: 2023-09-07 | Resolved: 2025-07-17

## 2025-07-17 PROBLEM — T63.441A BEE STING: Status: ACTIVE | Noted: 2025-07-17

## 2025-07-17 PROBLEM — I21.4 ACUTE NON-ST ELEVATION MYOCARDIAL INFARCTION (NSTEMI) (MULTI): Status: ACTIVE | Noted: 2025-07-17

## 2025-07-17 PROBLEM — C09.9 SQUAMOUS CELL CARCINOMA OF TONSIL: Status: RESOLVED | Noted: 2023-09-07 | Resolved: 2025-07-17

## 2025-07-17 PROBLEM — C43.62 MALIGNANT MELANOMA OF LEFT UPPER EXTREMITY INCLUDING SHOULDER: Status: RESOLVED | Noted: 2023-09-07 | Resolved: 2025-07-17

## 2025-07-17 PROBLEM — C09.9: Status: RESOLVED | Noted: 2023-09-07 | Resolved: 2025-07-17

## 2025-07-17 PROBLEM — Z95.5 CORONARY STENT PATENT: Status: ACTIVE | Noted: 2025-07-17

## 2025-07-17 PROCEDURE — 3074F SYST BP LT 130 MM HG: CPT | Performed by: FAMILY MEDICINE

## 2025-07-17 PROCEDURE — 1125F AMNT PAIN NOTED PAIN PRSNT: CPT | Performed by: FAMILY MEDICINE

## 2025-07-17 PROCEDURE — 4010F ACE/ARB THERAPY RXD/TAKEN: CPT | Performed by: FAMILY MEDICINE

## 2025-07-17 PROCEDURE — 3078F DIAST BP <80 MM HG: CPT | Performed by: FAMILY MEDICINE

## 2025-07-17 PROCEDURE — 3008F BODY MASS INDEX DOCD: CPT | Performed by: FAMILY MEDICINE

## 2025-07-17 PROCEDURE — G2211 COMPLEX E/M VISIT ADD ON: HCPCS | Performed by: FAMILY MEDICINE

## 2025-07-17 PROCEDURE — 99214 OFFICE O/P EST MOD 30 MIN: CPT | Performed by: FAMILY MEDICINE

## 2025-07-17 RX ORDER — LOSARTAN POTASSIUM 25 MG/1
25 TABLET ORAL DAILY
Qty: 90 TABLET | Refills: 3 | Status: SHIPPED | OUTPATIENT
Start: 2025-07-17

## 2025-07-17 RX ORDER — ROSUVASTATIN CALCIUM 20 MG/1
20 TABLET, COATED ORAL DAILY
Qty: 90 TABLET | Refills: 3 | Status: SHIPPED | OUTPATIENT
Start: 2025-07-17 | End: 2026-07-17

## 2025-07-17 RX ORDER — ALLOPURINOL 300 MG/1
300 TABLET ORAL DAILY
Qty: 90 TABLET | Refills: 3 | Status: SHIPPED | OUTPATIENT
Start: 2025-07-17 | End: 2026-07-17

## 2025-07-17 ASSESSMENT — COLUMBIA-SUICIDE SEVERITY RATING SCALE - C-SSRS
2. HAVE YOU ACTUALLY HAD ANY THOUGHTS OF KILLING YOURSELF?: NO
6. HAVE YOU EVER DONE ANYTHING, STARTED TO DO ANYTHING, OR PREPARED TO DO ANYTHING TO END YOUR LIFE?: NO
1. IN THE PAST MONTH, HAVE YOU WISHED YOU WERE DEAD OR WISHED YOU COULD GO TO SLEEP AND NOT WAKE UP?: NO

## 2025-07-17 ASSESSMENT — ENCOUNTER SYMPTOMS
DEPRESSION: 0
LOSS OF SENSATION IN FEET: 0
OCCASIONAL FEELINGS OF UNSTEADINESS: 0

## 2025-07-17 ASSESSMENT — PAIN SCALES - GENERAL: PAINLEVEL_OUTOF10: 6

## 2025-07-17 NOTE — PROGRESS NOTES
Presents for 3-month follow-up    1. Mixed hyperlipidemia   LDL at goal on Crestor   2. Hyperuricemia   Uric acid at goal on allopurinol   3. Altered metabolism due to diabetes (Multi)   Last A1c of 6.9 but recently has to reduce her dose of metformin secondary to lower blood sugars needs A1c rechecked   4. Primary hypertension   116/64 on current therapy   5. Transaminasemia   But had mild liver enzyme elevations at last check a secondary workup was ordered but has not been completed yet     All pertinent positive symptoms are included in history of present illness.    All other systems have been reviewed and are negative and noncontributory to this patient's current ailments.    CONSTITUTIONAL - INAD. Not ill appearing.  SKIN - No lesions or rashes visualized. No jaundice visualized.  HEENT- Atraumatic, normocephalic, no scleral icterus, external nares are not erythematous and without drainage, no neck masses visualized, oropharynx visualized and is without erythema or exudate  RESP - respiration not labored   CARDIAC - no grade 6 systolic murmurs auscultated  ABDOMEN - nondistended.  NEURO- CNs II-XII grossly intact        1. Mixed hyperlipidemia (Primary)  LDL at goal continue Crestor  - rosuvastatin (Crestor) 20 mg tablet; Take 1 tablet (20 mg) by mouth once daily. Pt not allergic to this RX  Dispense: 90 tablet; Refill: 3    2. Hyperuricemia  Uric acid at goal continue allopurinol  - allopurinol (Zyloprim) 300 mg tablet; Take 1 tablet (300 mg) by mouth once daily.  Dispense: 90 tablet; Refill: 3    3. Altered metabolism due to diabetes (Multi)  Recheck A1c, discontinue metformin due to side effects if A1c still elevated, will initiate alternative therapy  - Hemoglobin A1c; Future  - Hemoglobin A1c    4. Primary hypertension  Well-controlled continue medication  - losartan (Cozaar) 25 mg tablet; Take 1 tablet (25 mg) by mouth once daily.  Dispense: 90 tablet; Refill: 3    5. Transaminasemia  Continue workup as  previously prescribed including ultrasound and lab work suspect fatty liver

## 2025-07-22 ENCOUNTER — RESULTS FOLLOW-UP (OUTPATIENT)
Dept: PRIMARY CARE | Facility: CLINIC | Age: 75
End: 2025-07-22
Payer: MEDICARE

## 2025-07-22 DIAGNOSIS — E11.9 ALTERED METABOLISM DUE TO DIABETES (MULTI): Primary | ICD-10-CM

## 2025-07-22 LAB
EST. AVERAGE GLUCOSE BLD GHB EST-MCNC: 163 MG/DL
EST. AVERAGE GLUCOSE BLD GHB EST-SCNC: 9 MMOL/L
HBA1C MFR BLD: 7.3 %

## 2025-07-22 RX ORDER — GLIPIZIDE 5 MG/1
5 TABLET ORAL DAILY
Qty: 90 TABLET | Refills: 0 | Status: SHIPPED | OUTPATIENT
Start: 2025-07-22 | End: 2025-10-20

## 2025-07-24 ENCOUNTER — APPOINTMENT (OUTPATIENT)
Dept: RADIOLOGY | Facility: CLINIC | Age: 75
End: 2025-07-24
Payer: MEDICARE

## 2025-07-31 ENCOUNTER — HOSPITAL ENCOUNTER (OUTPATIENT)
Dept: RADIOLOGY | Facility: CLINIC | Age: 75
Discharge: HOME | End: 2025-07-31
Payer: MEDICARE

## 2025-07-31 DIAGNOSIS — R74.01 TRANSAMINASEMIA: ICD-10-CM

## 2025-07-31 PROCEDURE — 76705 ECHO EXAM OF ABDOMEN: CPT

## 2025-07-31 PROCEDURE — 76705 ECHO EXAM OF ABDOMEN: CPT | Performed by: RADIOLOGY

## 2025-08-11 ENCOUNTER — TELEPHONE (OUTPATIENT)
Dept: PRIMARY CARE | Facility: CLINIC | Age: 75
End: 2025-08-11
Payer: MEDICARE

## 2025-08-11 DIAGNOSIS — E11.9 ALTERED METABOLISM DUE TO DIABETES (MULTI): Primary | ICD-10-CM

## 2025-08-11 RX ORDER — GLYBURIDE 2.5 MG/1
2.5 TABLET ORAL
Qty: 90 TABLET | Refills: 0 | Status: SHIPPED | OUTPATIENT
Start: 2025-08-11 | End: 2025-11-09

## 2025-08-12 ENCOUNTER — TELEPHONE (OUTPATIENT)
Dept: PRIMARY CARE | Facility: CLINIC | Age: 75
End: 2025-08-12
Payer: MEDICARE